# Patient Record
Sex: FEMALE | Race: WHITE | NOT HISPANIC OR LATINO | Employment: OTHER | URBAN - METROPOLITAN AREA
[De-identification: names, ages, dates, MRNs, and addresses within clinical notes are randomized per-mention and may not be internally consistent; named-entity substitution may affect disease eponyms.]

---

## 2017-10-25 ENCOUNTER — HOSPITAL ENCOUNTER (OUTPATIENT)
Dept: LAB | Facility: MEDICAL CENTER | Age: 27
End: 2017-10-25
Attending: NURSE PRACTITIONER
Payer: COMMERCIAL

## 2017-10-25 LAB
ALBUMIN SERPL BCP-MCNC: 4.3 G/DL (ref 3.2–4.9)
ALBUMIN/GLOB SERPL: 1.7 G/DL
ALP SERPL-CCNC: 79 U/L (ref 30–99)
ALT SERPL-CCNC: 11 U/L (ref 2–50)
ANION GAP SERPL CALC-SCNC: 8 MMOL/L (ref 0–11.9)
AST SERPL-CCNC: 15 U/L (ref 12–45)
BILIRUB SERPL-MCNC: 0.5 MG/DL (ref 0.1–1.5)
BUN SERPL-MCNC: 14 MG/DL (ref 8–22)
CALCIUM SERPL-MCNC: 9.2 MG/DL (ref 8.5–10.5)
CHLORIDE SERPL-SCNC: 102 MMOL/L (ref 96–112)
CHOLEST SERPL-MCNC: 163 MG/DL (ref 100–199)
CO2 SERPL-SCNC: 24 MMOL/L (ref 20–33)
CREAT SERPL-MCNC: 0.82 MG/DL (ref 0.5–1.4)
CREAT UR-MCNC: 12.9 MG/DL
GFR SERPL CREATININE-BSD FRML MDRD: >60 ML/MIN/1.73 M 2
GLOBULIN SER CALC-MCNC: 2.6 G/DL (ref 1.9–3.5)
GLUCOSE SERPL-MCNC: 283 MG/DL (ref 65–99)
HDLC SERPL-MCNC: 85 MG/DL
LDLC SERPL CALC-MCNC: 65 MG/DL
MICROALBUMIN UR-MCNC: <0.7 MG/DL
MICROALBUMIN/CREAT UR: NORMAL MG/G (ref 0–30)
POTASSIUM SERPL-SCNC: 4.4 MMOL/L (ref 3.6–5.5)
PROT SERPL-MCNC: 6.9 G/DL (ref 6–8.2)
SODIUM SERPL-SCNC: 134 MMOL/L (ref 135–145)
T4 FREE SERPL-MCNC: 0.84 NG/DL (ref 0.53–1.43)
TRIGL SERPL-MCNC: 67 MG/DL (ref 0–149)
TSH SERPL DL<=0.005 MIU/L-ACNC: 0.65 UIU/ML (ref 0.3–3.7)

## 2017-10-25 PROCEDURE — 82570 ASSAY OF URINE CREATININE: CPT

## 2017-10-25 PROCEDURE — 80061 LIPID PANEL: CPT

## 2017-10-25 PROCEDURE — 82043 UR ALBUMIN QUANTITATIVE: CPT

## 2017-10-25 PROCEDURE — 36415 COLL VENOUS BLD VENIPUNCTURE: CPT

## 2017-10-25 PROCEDURE — 84443 ASSAY THYROID STIM HORMONE: CPT

## 2017-10-25 PROCEDURE — 84681 ASSAY OF C-PEPTIDE: CPT

## 2017-10-25 PROCEDURE — 80053 COMPREHEN METABOLIC PANEL: CPT

## 2017-10-25 PROCEDURE — 84439 ASSAY OF FREE THYROXINE: CPT

## 2017-10-27 LAB — C PEPTIDE SERPL-MCNC: 0.3 NG/ML (ref 0.8–3.5)

## 2018-05-14 ENCOUNTER — OFFICE VISIT (OUTPATIENT)
Dept: URGENT CARE | Facility: CLINIC | Age: 28
End: 2018-05-14
Payer: COMMERCIAL

## 2018-05-14 VITALS
SYSTOLIC BLOOD PRESSURE: 110 MMHG | RESPIRATION RATE: 16 BRPM | HEART RATE: 89 BPM | TEMPERATURE: 98.6 F | HEIGHT: 63 IN | OXYGEN SATURATION: 95 % | DIASTOLIC BLOOD PRESSURE: 70 MMHG | BODY MASS INDEX: 23.39 KG/M2 | WEIGHT: 132 LBS

## 2018-05-14 DIAGNOSIS — L03.314 CELLULITIS OF GROIN: ICD-10-CM

## 2018-05-14 PROCEDURE — 99213 OFFICE O/P EST LOW 20 MIN: CPT | Performed by: NURSE PRACTITIONER

## 2018-05-14 RX ORDER — TRAMADOL HYDROCHLORIDE 50 MG/1
50 TABLET ORAL EVERY 4 HOURS PRN
COMMUNITY
End: 2019-06-18

## 2018-05-14 RX ORDER — IBUPROFEN 800 MG/1
800 TABLET ORAL EVERY 8 HOURS PRN
Qty: 45 TAB | Refills: 0 | Status: SHIPPED | OUTPATIENT
Start: 2018-05-14 | End: 2019-06-18

## 2018-05-14 RX ORDER — SULFAMETHOXAZOLE AND TRIMETHOPRIM 800; 160 MG/1; MG/1
1 TABLET ORAL 2 TIMES DAILY
Qty: 20 TAB | Refills: 0 | Status: SHIPPED | OUTPATIENT
Start: 2018-05-14 | End: 2018-05-24

## 2018-05-14 ASSESSMENT — ENCOUNTER SYMPTOMS
COUGH: 0
DIARRHEA: 0
CHILLS: 0
MYALGIAS: 0
NAUSEA: 0
VOMITING: 0
PALPITATIONS: 0
SORE THROAT: 0
FEVER: 0
SHORTNESS OF BREATH: 0
DIZZINESS: 0
HEADACHES: 0

## 2018-05-14 NOTE — PROGRESS NOTES
"Subjective:      Rachel Esparza is a 28 y.o. female who presents with Cyst (on the tailbone, x 3 weeks)    Chief Complaint   Patient presents with   • Cyst     on the tailbone, x 3 weeks     States X 3 weeks, waxes and wanes  Putting CBD oil on it  Painful at times, worse than others.  Has had one like this before  No fever  No dysuria  No trouble with bowels          HPI    Review of Systems   Constitutional: Negative for chills, fever and malaise/fatigue.   HENT: Negative for congestion and sore throat.    Respiratory: Negative for cough and shortness of breath.    Cardiovascular: Negative for chest pain and palpitations.   Gastrointestinal: Negative for diarrhea, nausea and vomiting.   Genitourinary: Negative for dysuria.   Musculoskeletal: Negative for myalgias.   Skin: Negative for rash.   Neurological: Negative for dizziness and headaches.          Objective:     /70   Pulse 89   Temp 37 °C (98.6 °F)   Resp 16   Ht 1.6 m (5' 3\")   Wt 59.9 kg (132 lb)   SpO2 95%   BMI 23.38 kg/m²      Physical Exam   Constitutional: She is oriented to person, place, and time. She appears well-developed and well-nourished. No distress.   HENT:   Head: Normocephalic and atraumatic.   Cardiovascular: Normal rate.    Pulmonary/Chest: Effort normal.   Abdominal: Soft.   Musculoskeletal: Normal range of motion.   Neurological: She is alert and oriented to person, place, and time.   Skin: Skin is warm and dry.        Psychiatric: She has a normal mood and affect. Her behavior is normal. Judgment and thought content normal.   Nursing note and vitals reviewed.              Assessment/Plan:     1. Cellulitis of groin  Will heat pack and monitor  Return in 3 days for recheck  Discussed draining however no fluctuants appears and no defined center. .    - sulfamethoxazole-trimethoprim (BACTRIM DS) 800-160 MG tablet; Take 1 Tab by mouth 2 times a day for 10 days.  Dispense: 20 Tab; Refill: 0  - ibuprofen (MOTRIN) " 800 MG Tab; Take 1 Tab by mouth every 8 hours as needed.  Dispense: 45 Tab; Refill: 0    Please make an appointment for follow up with your primary care provider or make appointment to establish with a primary care. Return for any perception of change in condition, worsening of symptoms, such as perception of worse pain, worsening fever, not getting better, or any other concerns. Please go to the nearest emergency room for any shortness of breath or chest pain or for any of the emergent conditions we have discussed. Patient and/or family states understanding to plan of care, and discharge instructions. Different diagnosis were discussed and signs/symptoms were discussed to educate on.

## 2019-02-07 ENCOUNTER — HOSPITAL ENCOUNTER (OUTPATIENT)
Dept: LAB | Facility: MEDICAL CENTER | Age: 29
End: 2019-02-07
Attending: NURSE PRACTITIONER
Payer: COMMERCIAL

## 2019-02-07 LAB
ALBUMIN SERPL BCP-MCNC: 4.2 G/DL (ref 3.2–4.9)
ALBUMIN/GLOB SERPL: 1.4 G/DL
ALP SERPL-CCNC: 74 U/L (ref 30–99)
ALT SERPL-CCNC: 20 U/L (ref 2–50)
ANION GAP SERPL CALC-SCNC: 7 MMOL/L (ref 0–11.9)
AST SERPL-CCNC: 26 U/L (ref 12–45)
BILIRUB SERPL-MCNC: 0.5 MG/DL (ref 0.1–1.5)
BUN SERPL-MCNC: 9 MG/DL (ref 8–22)
CALCIUM SERPL-MCNC: 9.2 MG/DL (ref 8.5–10.5)
CHLORIDE SERPL-SCNC: 104 MMOL/L (ref 96–112)
CHOLEST SERPL-MCNC: 147 MG/DL (ref 100–199)
CO2 SERPL-SCNC: 26 MMOL/L (ref 20–33)
CREAT SERPL-MCNC: 0.77 MG/DL (ref 0.5–1.4)
CREAT UR-MCNC: 87.9 MG/DL
FASTING STATUS PATIENT QL REPORTED: NORMAL
GLOBULIN SER CALC-MCNC: 2.9 G/DL (ref 1.9–3.5)
GLUCOSE SERPL-MCNC: 285 MG/DL (ref 65–99)
HDLC SERPL-MCNC: 63 MG/DL
LDLC SERPL CALC-MCNC: 71 MG/DL
MICROALBUMIN UR-MCNC: <0.7 MG/DL
MICROALBUMIN/CREAT UR: NORMAL MG/G (ref 0–30)
POTASSIUM SERPL-SCNC: 4.5 MMOL/L (ref 3.6–5.5)
PROT SERPL-MCNC: 7.1 G/DL (ref 6–8.2)
SODIUM SERPL-SCNC: 137 MMOL/L (ref 135–145)
T4 FREE SERPL-MCNC: 0.84 NG/DL (ref 0.53–1.43)
TRIGL SERPL-MCNC: 66 MG/DL (ref 0–149)
TSH SERPL DL<=0.005 MIU/L-ACNC: 0.54 UIU/ML (ref 0.38–5.33)

## 2019-02-07 PROCEDURE — 84439 ASSAY OF FREE THYROXINE: CPT

## 2019-02-07 PROCEDURE — 36415 COLL VENOUS BLD VENIPUNCTURE: CPT

## 2019-02-07 PROCEDURE — 82043 UR ALBUMIN QUANTITATIVE: CPT

## 2019-02-07 PROCEDURE — 80061 LIPID PANEL: CPT

## 2019-02-07 PROCEDURE — 82570 ASSAY OF URINE CREATININE: CPT

## 2019-02-07 PROCEDURE — 84443 ASSAY THYROID STIM HORMONE: CPT

## 2019-02-07 PROCEDURE — 80053 COMPREHEN METABOLIC PANEL: CPT

## 2019-06-18 ENCOUNTER — HOSPITAL ENCOUNTER (EMERGENCY)
Facility: MEDICAL CENTER | Age: 29
End: 2019-06-18
Attending: EMERGENCY MEDICINE
Payer: COMMERCIAL

## 2019-06-18 VITALS
HEART RATE: 85 BPM | RESPIRATION RATE: 16 BRPM | HEIGHT: 63 IN | SYSTOLIC BLOOD PRESSURE: 132 MMHG | WEIGHT: 149.69 LBS | OXYGEN SATURATION: 98 % | BODY MASS INDEX: 26.52 KG/M2 | TEMPERATURE: 99.4 F | DIASTOLIC BLOOD PRESSURE: 90 MMHG

## 2019-06-18 DIAGNOSIS — E10.9 TYPE 1 DIABETES MELLITUS WITHOUT COMPLICATION (HCC): ICD-10-CM

## 2019-06-18 DIAGNOSIS — Z76.0 MEDICATION REFILL: ICD-10-CM

## 2019-06-18 LAB — GLUCOSE BLD-MCNC: 209 MG/DL (ref 65–99)

## 2019-06-18 PROCEDURE — 99282 EMERGENCY DEPT VISIT SF MDM: CPT

## 2019-06-18 PROCEDURE — 82962 GLUCOSE BLOOD TEST: CPT

## 2019-06-18 NOTE — ED NOTES
Pt discharged home. Explained discharge and medication instructions. Questions and comments addressed. Pt verbalized understanding of instructions. Pt advised to follow-up with [facility] or return to ED for any new or worsening of symptoms. Pt is ambulating well and steady on feet. VS stable.

## 2019-06-18 NOTE — ED TRIAGE NOTES
".Rachel Esparza  .  Chief Complaint   Patient presents with   • Medication Refill     patient reports she is out of her humalog, last dose was sunday night and her lantus, last dose was last night.      Patient to triage with above complaint. Patient denies any pain or n/v. Patient reports her endocrinologist left the office and she is unable to get her insulin refilled. .Blood Pressure: 132/90, Pulse: 85, Respiration: 16, Temperature: 37.4 °C (99.4 °F), Height: 160 cm (5' 3\"), Weight: 67.9 kg (149 lb 11.1 oz), Pulse Oximetry: 98 %    Patient to lobby and instructed to inform staff of any needs.  "

## 2019-06-18 NOTE — ED PROVIDER NOTES
ED Provider Note    CHIEF COMPLAINT  Chief Complaint   Patient presents with   • Medication Refill     patient reports she is out of her humalog, last dose was sunday night and her lantus, last dose was last night.        HPI  Rachel Esparza is a 29 y.o. female who presents for refill of her prescription.  Patient has a history of diabetes mellitus and is currently on insulin.  The patient states that her endocrinologist left the practice office she was going to and that they would no longer feel her prescriptions.  She was referred back to primary care who would not see her because she has not been seen for 2 years.  Patient is having no acute symptoms.    REVIEW OF SYSTEMS  See HPI for further details.  No history of thyroid dysfunction, seizures, cardiopulmonary disorders, gastrointestinal disorders;    PAST MEDICAL HISTORY  Past Medical History:   Diagnosis Date   • Diabetes (HCC)    • Hyperlipidemia        FAMILY HISTORY  Family History   Problem Relation Age of Onset   • Hypertension Father    • Diabetes Maternal Uncle    • Diabetes Paternal Aunt    • Diabetes Paternal Uncle    • Arthritis Maternal Grandmother         Rheumatoid arthritis       SOCIAL HISTORY  Non-smoker; positive alcohol use; positive marijuana use;  SURGICAL HISTORY  History reviewed. No pertinent surgical history.    CURRENT MEDICATIONS  Home Medications     Reviewed by Jenny Dill R.N. (Registered Nurse) on 06/18/19 at 1205  Med List Status: Complete   Medication Last Dose Status   B-D ULTRAFINE III SHORT PEN 31G X 8 MM MISC  Active   insulin aspart (NOVOLOG FLEXPEN) 100 UNIT/ML SOPN injection  Active   insulin lispro (HUMALOG) 100 UNIT/ML Solution 6/16/2019 Active   LANTUS SOLOSTAR 100 UNIT/ML SOPN injection 6/17/2019 Active   ONE TOUCH ULTRA TEST strip  Active   ONE TOUCH ULTRASOFT LANCETS MISC  Active                ALLERGIES  No Known Allergies    PHYSICAL EXAM  VITAL SIGNS: /90   Pulse 85   Temp 37.4 °C  "(99.4 °F) (Temporal)   Resp 16   Ht 1.6 m (5' 3\")   Wt 67.9 kg (149 lb 11.1 oz)   SpO2 98%   BMI 26.52 kg/m²    Constitutional: 29-year-old female, anxious, awake, oriented x3  HENT: Normocephalic, Atraumatic, Nares:Clear, Oropharynx: moist, well hydrated, posterior pharynx:clear   Eyes: PERRL, EOMI, Conjunctiva normal, No discharge.   Neck: Normal range of motion, No tenderness, Supple, No stridor.   Lymphatic: No lymphadenopathy noted.   Cardiovascular: Regular rate and rhythm without mumurs, gallups, rubs   Thorax & Lungs: Normal Equal breath sounds, No respiratory distress, No wheezing, no stridor, no rales. No chest tenderness.   Skin: Good skin turgor, pink, warm, dry. No rashes, petechiae, purpura. Normal capillary refill.   Neurologic: Alert & oriented x 3,  No gross focal deficits noted.   Psychiatric: Affect normal, Judgment normal, Mood normal.     COURSE & MEDICAL DECISION MAKING  Pertinent Labs & Imaging studies reviewed. (See chart for details)  Discussion: At this time, the patient presents for prescription refill.  I see no acute conditions that warrant work-up or intervention in the ED.  I have written a prescription for her Humalog.  She is to follow-up and reestablish primary care and endocrinology care.    FINAL IMPRESSION  1. Medication refill    2. Type 1 diabetes mellitus without complication (HCC)           PLAN  1.  Appropriate discharge instructions given  2.  Humalog prescription written  3.  Follow-up with primary care    Electronically signed by: Guy G Gansert, 6/18/2019 12:35 PM      "

## 2019-07-15 ENCOUNTER — TELEPHONE (OUTPATIENT)
Dept: SCHEDULING | Facility: IMAGING CENTER | Age: 29
End: 2019-07-15

## 2019-08-23 ENCOUNTER — OFFICE VISIT (OUTPATIENT)
Dept: MEDICAL GROUP | Facility: LAB | Age: 29
End: 2019-08-23
Payer: COMMERCIAL

## 2019-08-23 VITALS
BODY MASS INDEX: 25.98 KG/M2 | OXYGEN SATURATION: 96 % | RESPIRATION RATE: 16 BRPM | TEMPERATURE: 97.4 F | HEART RATE: 70 BPM | DIASTOLIC BLOOD PRESSURE: 64 MMHG | SYSTOLIC BLOOD PRESSURE: 106 MMHG | WEIGHT: 146.6 LBS | HEIGHT: 63 IN

## 2019-08-23 DIAGNOSIS — E10.9 TYPE 1 DIABETES MELLITUS WITHOUT COMPLICATION (HCC): ICD-10-CM

## 2019-08-23 DIAGNOSIS — R21 RASH: ICD-10-CM

## 2019-08-23 DIAGNOSIS — E83.119 HEMOCHROMATOSIS, UNSPECIFIED HEMOCHROMATOSIS TYPE: ICD-10-CM

## 2019-08-23 LAB
HBA1C MFR BLD: 7.9 % (ref 0–5.6)
INT CON NEG: NEGATIVE
INT CON POS: POSITIVE

## 2019-08-23 PROCEDURE — 99215 OFFICE O/P EST HI 40 MIN: CPT | Performed by: NURSE PRACTITIONER

## 2019-08-23 PROCEDURE — 83036 HEMOGLOBIN GLYCOSYLATED A1C: CPT | Performed by: NURSE PRACTITIONER

## 2019-08-23 RX ORDER — CLOTRIMAZOLE AND BETAMETHASONE DIPROPIONATE 10; .64 MG/G; MG/G
1 CREAM TOPICAL 2 TIMES DAILY
Qty: 1 TUBE | Refills: 0 | Status: ON HOLD | OUTPATIENT
Start: 2019-08-23 | End: 2021-08-06

## 2019-08-23 ASSESSMENT — PATIENT HEALTH QUESTIONNAIRE - PHQ9: CLINICAL INTERPRETATION OF PHQ2 SCORE: 0

## 2019-08-23 NOTE — ASSESSMENT & PLAN NOTE
New to me, chronic.  Diagnosed at 25[facility] by hematology.   She has had 3 phlebotomies.  Currently stable.  Requests referral back to a new hematologist.

## 2019-08-23 NOTE — ASSESSMENT & PLAN NOTE
New to me, chronic.  Diagnosed 4 years ago.  She was following with endocrinology at [facility] however her endocrinologist left and they never established her with a new one.  Her Humalog and was sent to the ER to refill hence she needed to establish with primary care and get referral back to an endocrinologist.  A1c is improved from 9.1 to 7.9.    Lantus 18 units at bedtime and  Humalog : >150 1:50 correction  1:10 CHO ratio     This is chronic. Stable. Currently taking  as directed. She is not taking a daily aspirin, statin, and ACE-I/ARB on recommendation from her prior endocrinologist.   Denies side effects or hypoglycemic events from medications.  Last HbA1c is 7.9  She is monitoring blood sugar regularly at home.   Reports diet has been very strict lately.  Carb restricting. She is not getting any low blood sugars.   She is exercising regularly. Weights and cardio  Last eye exam: 2 months ago. [facility]  Doing regular foot exams and care.  Denies polyuria, polydipsia, blurred vision, early satiety, or neuropathies.

## 2019-08-23 NOTE — ASSESSMENT & PLAN NOTE
New to me, chronic.  Patient has rash on bilateral lower extremities.  Itchy circular red rash which is sometimes better without any intervention.  No blisters or pustules. Rash is not painful. Not precipitated by sun exposure.   No topical or other home treatment tried.   No new lotion, soap, detergent, sheets, towels, topical or oral medicine.   No similarly affected contacts. No pets in household. No known insect infestations in household.    ROS:  No mouth or lip redness, itching, blisters, or swelling  No difficulty swallowing or breathing.  No cough or wheeze.

## 2019-08-23 NOTE — PROGRESS NOTES
CC:Diagnoses of Hemochromatosis, unspecified hemochromatosis type, Type 1 diabetes mellitus without complication (HCC), and Rash were pertinent to this visit.    HISTORY OF PRESENT ILLNESS: Rachel Esparza is a 29 y.o. female established patient who presents today to discuss the following problems:    Type 1 diabetes mellitus without complication  New to me, chronic.  Diagnosed 4 years ago.  She was following with endocrinology at [facility] however her endocrinologist left and they never established her with a new one.  Her Humalog and was sent to the ER to refill hence she needed to establish with primary care and get referral back to an endocrinologist.  A1c is improved from 9.1 to 7.9.    Lantus 18 units at bedtime and  Humalog : >150 1:50 correction  1:10 CHO ratio     This is chronic. Stable. Currently taking  as directed. She is not taking a daily aspirin, statin, and ACE-I/ARB on recommendation from her prior endocrinologist.   Denies side effects or hypoglycemic events from medications.  Last HbA1c is 7.9  She is monitoring blood sugar regularly at home.   Reports diet has been very strict lately.  Carb restricting. She is not getting any low blood sugars.   She is exercising regularly. Weights and cardio  Last eye exam: 2 months ago. [facility].  Doing regular foot exams and care.  Denies polyuria, polydipsia, blurred vision, early satiety, or neuropathies.          Hemochromatosis  New to me, chronic.  Diagnosed at 25[facility] by hematology.   She has had 3 phlebotomies.  Currently stable.  Requests referral back to a new hematologist.    Rash  New to me, chronic.  Patient has rash on bilateral lower extremities.  Itchy circular red rash which is sometimes better without any intervention.  No blisters or pustules. Rash is not painful. Not precipitated by sun exposure.   No topical or other home treatment tried.   No new lotion, soap, detergent, sheets, towels, topical or oral medicine.   No similarly  affected contacts. No pets in household. No known insect infestations in household.    ROS:  No mouth or lip redness, itching, blisters, or swelling  No difficulty swallowing or breathing.  No cough or wheeze.         Health Maintenance: Completed    Patient Active Problem List    Diagnosis Date Noted   • Rash 08/23/2019   • Hemochromatosis 10/04/2018   • Type 1 diabetes mellitus without complication (HCC) 05/22/2015        Allergies:Patient has no known allergies.    Current Outpatient Medications   Medication Sig Dispense Refill   • clotrimazole-betamethasone (LOTRISONE) 1-0.05 % Cream Apply 1 Application to affected area(s) 2 times a day. 1 Tube 0   • insulin lispro (HUMALOG) 100 UNIT/ML Solution Use as directed 5 Vial 1   • LANTUS SOLOSTAR 100 UNIT/ML SOPN injection Inject 14 Units as instructed every bedtime.     • B-D ULTRAFINE III SHORT PEN 31G X 8 MM MISC 1 Each every bedtime.     • insulin regular (HUMULIN R) 100 Unit/mL Solution Use as directed (Patient not taking: Reported on 8/23/2019) 5 Vial 3   • insulin aspart (NOVOLOG FLEXPEN) 100 UNIT/ML SOPN injection Inject 3 Units as instructed 3 times a day before meals. (Patient not taking: Reported on 8/23/2019) 1 PEN 0   • ONE TOUCH ULTRA TEST strip 1 Each every day.     • ONE TOUCH ULTRASOFT LANCETS MISC 1 Each every day.       No current facility-administered medications for this visit.        Social History     Tobacco Use   • Smoking status: Never Smoker   • Smokeless tobacco: Never Used   Substance Use Topics   • Alcohol use: Yes     Alcohol/week: 3.0 oz     Types: 5 Shots of liquor per week     Comment: 1-2 drink daily   • Drug use: Yes     Types: Marijuana, Inhaled     Comment: marijuana     Social History     Social History Narrative   • Not on file       Family History   Problem Relation Age of Onset   • Hypertension Father    • Diabetes Maternal Uncle    • Diabetes Paternal Aunt    • Diabetes Paternal Uncle    • Arthritis Maternal Grandmother         " Rheumatoid arthritis       ROS:     Constitutional:  Negative for fever, chills, unexpected weight change, and fatigue/generalized weakness.  HEENT:  Negative for headaches, vision changes, hearing changes, ear pain, ear discharge, rhinorrhea, sinus congestion, sore throat, and neck pain.    Respiratory: Negative for cough, sputum production, chest congestion, dyspnea, wheezing, and crackles.    Cardiovascular:Negative for chest pain, palpitations, orthopnea, and bilateral lower extremity edema.   Gastrointestinal:Negative for heartburn, nausea, vomiting, abdominal pain, hematochezia, melena, diarrhea, constipation, and greasy/foul-smelling stools.   Genitourinary: Negative for dysuria, polyuria, hematuria, pyuria, urinary urgency, and urinary incontinence.   Musculoskeletal:Negative for myalgias, back pain, and joint pain.   Skin: Negative for rash, itching, cyanotic skin color change.   Neurological: Negative for dizziness, tingling, tremors, focal sensory deficit, focal weakness and headaches.   Endo/Heme/Allergies: Does not bruise/bleed easily.   Psychiatric/Behavioral: Negative for depression, suicidal/homicidal ideation and memory loss.        EXAM:   /64 (BP Location: Right arm, Patient Position: Sitting, BP Cuff Size: Adult)   Pulse 70   Temp 36.3 °C (97.4 °F) (Temporal)   Resp 16   Ht 1.6 m (5' 3\")   Wt 66.5 kg (146 lb 9.6 oz)   SpO2 96%  Body mass index is 25.97 kg/m².    Constitutional: Well-developed and well-nourished. Not diaphoretic. No distress.   Skin: Skin is warm and dry. Multiple small circular patches of erythema bilateral lower extremities. No open lesions or discharge.  Head: Atraumatic without lesions.  Eyes: Conjunctivae and extraocular motions are normal. Pupils are equal, round, and reactive to light. No scleral icterus.   Ears:  External ears unremarkable. Tympanic membranes clear and intact.  Nose: Nares patent. Mucosa without edema or erythema. No discharge. No facial " tenderness.  Mouth/Throat: Tongue normal. Oropharynx is clear and moist. Posterior pharynx without erythema or exudates.  Neck: Supple, trachea midline. No thyromegaly present. No cervical or supraclavicular lymphadenopathy.  Cardiovascular: Regular rate and rhythm.   Chest: Effort normal. Clear to auscultation throughout. No adventitious sounds.   Abdomen: Soft, non tender, and without distention. Active bowel sounds in all four quadrants. No rebound, guarding, masses or hepatosplenomegaly.  Extremities: No cyanosis, clubbing, erythema, nor edema.   Neurological: Alert and oriented x 3. Sensation intact.   Psychiatric:  Behavior, mood, and affect are appropriate.       ASSESSMENT/PLAN:    1. Hemochromatosis, unspecified hemochromatosis type  Chronic stable. Needs new hematologist.  - REFERRAL TO HEMATOLOGY ONCOLOGY Referral to [facility]    2. Type 1 diabetes mellitus without complication (HCC)  Chronic stable. Negative monofilament.   Eye exam records requested  - REFERRAL TO ENDOCRINOLOGY  - POCT Hemoglobin A1C  - Diabetic Monofilament Lower Extremity Exam    3. Rash  New problem uncontrolled.    - clotrimazole-betamethasone (LOTRISONE) 1-0.05 % Cream; Apply 1 Application to affected area(s) 2 times a day.  Dispense: 1 Tube; Refill: 0    4.  Lipomas vs ganglion cysts.  Bilateral knees, right ankle    Total 40 minutes face-to-face time spent with patient, with greater than 50% of the total time discussing patient's issues and symptoms as listed above in assessment and plan, as well as managing coordination of care for future evaluation and treatment.    Return in about 3 months (around 11/23/2019) for Routine Follow up.       Please note that this dictation was created using voice recognition software. I have made every reasonable attempt to correct obvious errors, but I expect that there are errors of grammar and possibly content that I did not discover before finalizing the note.

## 2019-10-24 NOTE — TELEPHONE ENCOUNTER
Was the patient seen in the last year in this department? Yes  8/23/19  Does patient have an active prescription for medications requested? No     Received Request Via: Pharmacy

## 2019-10-25 ENCOUNTER — TELEPHONE (OUTPATIENT)
Dept: MEDICAL GROUP | Facility: PHYSICIAN GROUP | Age: 29
End: 2019-10-25

## 2019-10-25 DIAGNOSIS — E10.9 TYPE 1 DIABETES MELLITUS WITHOUT COMPLICATION (HCC): ICD-10-CM

## 2019-10-25 NOTE — TELEPHONE ENCOUNTER
Patient called,stating she had run out of her Humalog Pens--I sent a Rx for 5 pens with 1 refill to [facility] on [address].   Darci Underwood M.D.

## 2019-11-18 ENCOUNTER — OFFICE VISIT (OUTPATIENT)
Dept: ENDOCRINOLOGY | Facility: MEDICAL CENTER | Age: 29
End: 2019-11-18
Payer: COMMERCIAL

## 2019-11-18 VITALS
HEIGHT: 63 IN | BODY MASS INDEX: 26.75 KG/M2 | DIASTOLIC BLOOD PRESSURE: 72 MMHG | SYSTOLIC BLOOD PRESSURE: 116 MMHG | WEIGHT: 151 LBS | OXYGEN SATURATION: 92 % | HEART RATE: 90 BPM

## 2019-11-18 DIAGNOSIS — E10.9 TYPE 1 DIABETES MELLITUS WITHOUT COMPLICATION (HCC): ICD-10-CM

## 2019-11-18 DIAGNOSIS — Z79.4 ENCOUNTER FOR LONG-TERM (CURRENT) USE OF INSULIN (HCC): ICD-10-CM

## 2019-11-18 LAB
HBA1C MFR BLD: 8.3 % (ref 0–5.6)
INT CON NEG: NEGATIVE
INT CON POS: POSITIVE

## 2019-11-18 PROCEDURE — 83036 HEMOGLOBIN GLYCOSYLATED A1C: CPT | Performed by: PHYSICIAN ASSISTANT

## 2019-11-18 PROCEDURE — 99204 OFFICE O/P NEW MOD 45 MIN: CPT | Performed by: PHYSICIAN ASSISTANT

## 2019-11-18 NOTE — PATIENT INSTRUCTIONS
They are on:  1.  Lantus   19 before bed (tresiba 18 at night before bed)  2.  Humalog  Carb ratio 1:10 carbs  Correction factor 1:50 above 100

## 2019-11-18 NOTE — PROGRESS NOTES
New Patient Consult Note  Referred by: OPAL Grissom    Reason for consult: Diabetes Management Type 1    HPI:  Rachel Esparza is a 29 y.o. old patient who is seeing us today for diabetes care.  This is a pleasant patient with diabetes and I appreciate the opportunity to participate in the care of this patient.  This is a new patient with me today.    Labs of 11/18/2019 HbA1c is 8.3  Labs of 2/7/19 microalbumin negative, GFR >60, HDL 63, LDL 71, TSH 0.540,    Labs of 10/25/17 c-peptide 0.2, SHEA-65 negative, isletcell negative, c-peptide 1.1    BG Diary:11/18/2019  In the AM:  No log    Has been Diabetic since  T1 4 years  Has a Glucagon pen at home: no    They get there labs done at: [facility]  They see an eye provider at: seeing [facility]  Hyperlipidemia: labs ordered    1. Type 1 diabetes mellitus without complication (HCC)  This is a new patient with me on 11/18/2019  They are on:  1.  Lantus   19 before bed  2.  Humalog  Carb ratio 1:10 carbs  Correction factor 1:50 above 150    2. Encounter for long-term (current) use of insulin (HCC)  Is on a high risk medication Insulin and we will continue to follow       ROS:   Constitutional: No change in weight , No fatigue, No night sweats.  HEENT: No Headache.  Eyes:  No blurred vision, No visual changes.  Cardiac: No chest pain, No palpitations.  Resp: No shortness of breath, No cough,   Gastro: No nausea or vomiting, No diarrhea.  Neuro: Denies numbness or tinging in bilateral feet or hands, and no loss of sensation.  Endo: No heat or cold intolerance.  : No polyuria, No polydipsia, No chronic UTI's.  Lower extremities: No lower leg edema bilateral.  All other systems were reviewed and were negative.    Past Medical History:  Patient Active Problem List    Diagnosis Date Noted   • Encounter for long-term (current) use of insulin (HCC) 11/18/2019   • Rash 08/23/2019   • Hemochromatosis 10/04/2018   • Type 1 diabetes mellitus without  complication (HCC) 05/22/2015       Past Surgical History:  History reviewed. No pertinent surgical history.    Allergies:  Patient has no known allergies.    Social History:  Social History     Socioeconomic History   • Marital status: Single     Spouse name: Not on file   • Number of children: Not on file   • Years of education: Not on file   • Highest education level: Not on file   Occupational History   • Not on file   Social Needs   • Financial resource strain: Not on file   • Food insecurity:     Worry: Not on file     Inability: Not on file   • Transportation needs:     Medical: Not on file     Non-medical: Not on file   Tobacco Use   • Smoking status: Never Smoker   • Smokeless tobacco: Never Used   Substance and Sexual Activity   • Alcohol use: Yes     Alcohol/week: 3.0 oz     Types: 5 Shots of liquor per week     Comment: 1-2 drink daily   • Drug use: Yes     Types: Marijuana, Inhaled     Comment: marijuana   • Sexual activity: Yes     Partners: Male     Birth control/protection: Condom   Lifestyle   • Physical activity:     Days per week: Not on file     Minutes per session: Not on file   • Stress: Not on file   Relationships   • Social connections:     Talks on phone: Not on file     Gets together: Not on file     Attends Rastafari service: Not on file     Active member of club or organization: Not on file     Attends meetings of clubs or organizations: Not on file     Relationship status: Not on file   • Intimate partner violence:     Fear of current or ex partner: Not on file     Emotionally abused: Not on file     Physically abused: Not on file     Forced sexual activity: Not on file   Other Topics Concern   • Not on file   Social History Narrative   • Not on file       Family History:  Family History   Problem Relation Age of Onset   • Hypertension Father    • Diabetes Maternal Uncle    • Diabetes Paternal Aunt    • Diabetes Paternal Uncle    • Arthritis Maternal Grandmother         Rheumatoid  "arthritis       Medications:    Current Outpatient Medications:   •  insulin lispro, Human, (HUMALOG) 100 UNIT/ML injection PEN, Inject 1 Unit per 10 gm of carbohydrates before meals, Disp: 5 PEN, Rfl: 1  •  LANTUS SOLOSTAR 100 UNIT/ML SOPN injection, Inject 14 Units as instructed every bedtime., Disp: , Rfl:   •  B-D ULTRAFINE III SHORT PEN 31G X 8 MM MISC, 1 Each every bedtime., Disp: , Rfl:   •  clotrimazole-betamethasone (LOTRISONE) 1-0.05 % Cream, Apply 1 Application to affected area(s) 2 times a day., Disp: 1 Tube, Rfl: 0      Physical Examination:   Vital signs: /72 (BP Location: Left arm, Patient Position: Sitting)   Pulse 90   Ht 1.6 m (5' 3\")   Wt 68.5 kg (151 lb)   SpO2 92%   BMI 26.75 kg/m²   General: No distress, cooperative, well dressed and well nourished.   Eyes: No scleral icterus or discharge, No hyposphagma  ENMT: Normal on external inspection of nose, lips, No nasal drainage   Neck: No abnormal masses on inspection  Resp: Normal effort, Bilateral clear to auscultation, No wheezing, No rales  CVS: Regular rate and rhythm, S1 S2 normal, No murmur. No gallop  Extremities: No edema bilateral extremities  Neuro: Alert and oriented  Skin: No rash, No Ulcers  Psych: Normal mood and affect    Assessment and Plan:    1. Type 1 diabetes mellitus without complication (HCC)    They are on:  1.  Lantus   19 before bed (tresiba 18 at night before bed)  2.  Humalog  Carb ratio 1:10 carbs  Correction factor 1:50 above 100  3.  I gave her pamphlets of dexcom, eversense and medtronic and asked her to look them up and decided which she wants. She is in need of a CGM.  4.  Labs of c-peptide and ALL Antibodies ordered    2. Encounter for long-term (current) use of insulin (HCC)  Is on a high risk medication Insulin and we will continue to follow     Return in about 1 month (around 12/18/2019).    This patient during there office visit was started on new medication Tresiba.  Side effects of new medications " were discussed with the patient today in the office. The patient was supplied paperwork on this new medication.    Thank you kindly for allowing me to participate in the diabetes care plan for this patient.    Luc Wilks PA-C, [facility]  Board Certified - Advanced Diabetes Management  11/18/19    CC:   OPAL Grissom

## 2019-12-23 ENCOUNTER — APPOINTMENT (OUTPATIENT)
Dept: ENDOCRINOLOGY | Facility: MEDICAL CENTER | Age: 29
End: 2019-12-23
Payer: COMMERCIAL

## 2020-01-04 ENCOUNTER — HOSPITAL ENCOUNTER (OUTPATIENT)
Dept: LAB | Facility: MEDICAL CENTER | Age: 30
End: 2020-01-04
Attending: INTERNAL MEDICINE
Payer: COMMERCIAL

## 2020-01-04 LAB
ALBUMIN SERPL BCP-MCNC: 4.2 G/DL (ref 3.2–4.9)
ALBUMIN/GLOB SERPL: 1.4 G/DL
ALP SERPL-CCNC: 74 U/L (ref 30–99)
ALT SERPL-CCNC: 12 U/L (ref 2–50)
ANION GAP SERPL CALC-SCNC: 7 MMOL/L (ref 0–11.9)
AST SERPL-CCNC: 13 U/L (ref 12–45)
BASOPHILS # BLD AUTO: 0.6 % (ref 0–1.8)
BASOPHILS # BLD: 0.03 K/UL (ref 0–0.12)
BILIRUB SERPL-MCNC: 0.8 MG/DL (ref 0.1–1.5)
BUN SERPL-MCNC: 11 MG/DL (ref 8–22)
CALCIUM SERPL-MCNC: 9 MG/DL (ref 8.5–10.5)
CHLORIDE SERPL-SCNC: 100 MMOL/L (ref 96–112)
CO2 SERPL-SCNC: 28 MMOL/L (ref 20–33)
CREAT SERPL-MCNC: 0.84 MG/DL (ref 0.5–1.4)
EOSINOPHIL # BLD AUTO: 0.13 K/UL (ref 0–0.51)
EOSINOPHIL NFR BLD: 2.4 % (ref 0–6.9)
ERYTHROCYTE [DISTWIDTH] IN BLOOD BY AUTOMATED COUNT: 42.8 FL (ref 35.9–50)
FERRITIN SERPL-MCNC: 52.1 NG/ML (ref 10–291)
GLOBULIN SER CALC-MCNC: 2.9 G/DL (ref 1.9–3.5)
GLUCOSE SERPL-MCNC: 268 MG/DL (ref 65–99)
HCT VFR BLD AUTO: 44.1 % (ref 37–47)
HGB BLD-MCNC: 14.8 G/DL (ref 12–16)
IMM GRANULOCYTES # BLD AUTO: 0.01 K/UL (ref 0–0.11)
IMM GRANULOCYTES NFR BLD AUTO: 0.2 % (ref 0–0.9)
LYMPHOCYTES # BLD AUTO: 1.46 K/UL (ref 1–4.8)
LYMPHOCYTES NFR BLD: 27.1 % (ref 22–41)
MCH RBC QN AUTO: 32.2 PG (ref 27–33)
MCHC RBC AUTO-ENTMCNC: 33.6 G/DL (ref 33.6–35)
MCV RBC AUTO: 95.9 FL (ref 81.4–97.8)
MONOCYTES # BLD AUTO: 0.38 K/UL (ref 0–0.85)
MONOCYTES NFR BLD AUTO: 7.1 % (ref 0–13.4)
NEUTROPHILS # BLD AUTO: 3.38 K/UL (ref 2–7.15)
NEUTROPHILS NFR BLD: 62.6 % (ref 44–72)
NRBC # BLD AUTO: 0 K/UL
NRBC BLD-RTO: 0 /100 WBC
PLATELET # BLD AUTO: 304 K/UL (ref 164–446)
PMV BLD AUTO: 9.6 FL (ref 9–12.9)
POTASSIUM SERPL-SCNC: 3.9 MMOL/L (ref 3.6–5.5)
PROT SERPL-MCNC: 7.1 G/DL (ref 6–8.2)
RBC # BLD AUTO: 4.6 M/UL (ref 4.2–5.4)
SODIUM SERPL-SCNC: 135 MMOL/L (ref 135–145)
WBC # BLD AUTO: 5.4 K/UL (ref 4.8–10.8)

## 2020-01-04 PROCEDURE — 80053 COMPREHEN METABOLIC PANEL: CPT

## 2020-01-04 PROCEDURE — 82785 ASSAY OF IGE: CPT

## 2020-01-04 PROCEDURE — 85025 COMPLETE CBC W/AUTO DIFF WBC: CPT

## 2020-01-04 PROCEDURE — 82784 ASSAY IGA/IGD/IGG/IGM EACH: CPT

## 2020-01-04 PROCEDURE — 84155 ASSAY OF PROTEIN SERUM: CPT

## 2020-01-04 PROCEDURE — 84165 PROTEIN E-PHORESIS SERUM: CPT

## 2020-01-04 PROCEDURE — 82728 ASSAY OF FERRITIN: CPT

## 2020-01-04 PROCEDURE — 36415 COLL VENOUS BLD VENIPUNCTURE: CPT

## 2020-01-06 ENCOUNTER — HOSPITAL ENCOUNTER (OUTPATIENT)
Facility: MEDICAL CENTER | Age: 30
End: 2020-01-06
Attending: INTERNAL MEDICINE
Payer: COMMERCIAL

## 2020-01-06 LAB
IGA SERPL-MCNC: 338 MG/DL (ref 68–408)
IGE SERPL-ACNC: 97 KU/L
IGM SERPL-MCNC: 87 MG/DL (ref 35–263)

## 2020-01-06 PROCEDURE — 84156 ASSAY OF PROTEIN URINE: CPT

## 2020-01-06 PROCEDURE — 83883 ASSAY NEPHELOMETRY NOT SPEC: CPT

## 2020-01-07 ENCOUNTER — OFFICE VISIT (OUTPATIENT)
Dept: MEDICAL GROUP | Facility: LAB | Age: 30
End: 2020-01-07
Payer: COMMERCIAL

## 2020-01-07 VITALS
HEART RATE: 76 BPM | SYSTOLIC BLOOD PRESSURE: 108 MMHG | HEIGHT: 63 IN | WEIGHT: 152 LBS | TEMPERATURE: 99.3 F | OXYGEN SATURATION: 96 % | BODY MASS INDEX: 26.93 KG/M2 | DIASTOLIC BLOOD PRESSURE: 68 MMHG

## 2020-01-07 DIAGNOSIS — M25.561 CHRONIC PAIN OF BOTH KNEES: ICD-10-CM

## 2020-01-07 DIAGNOSIS — E10.9 TYPE 1 DIABETES MELLITUS WITHOUT COMPLICATION (HCC): ICD-10-CM

## 2020-01-07 DIAGNOSIS — G89.29 CHRONIC PAIN OF BOTH KNEES: ICD-10-CM

## 2020-01-07 DIAGNOSIS — R21 RASH: ICD-10-CM

## 2020-01-07 DIAGNOSIS — M25.562 CHRONIC PAIN OF BOTH KNEES: ICD-10-CM

## 2020-01-07 DIAGNOSIS — E83.119 HEMOCHROMATOSIS, UNSPECIFIED HEMOCHROMATOSIS TYPE: ICD-10-CM

## 2020-01-07 LAB
ALBUMIN SERPL ELPH-MCNC: 4.27 G/DL (ref 3.75–5.01)
ALPHA1 GLOB SERPL ELPH-MCNC: 0.25 G/DL (ref 0.19–0.46)
ALPHA2 GLOB SERPL ELPH-MCNC: 0.67 G/DL (ref 0.48–1.05)
B-GLOBULIN SERPL ELPH-MCNC: 0.82 G/DL (ref 0.48–1.1)
EER PROT ELECT SER Q1092: NORMAL
GAMMA GLOB SERPL ELPH-MCNC: 0.79 G/DL (ref 0.62–1.51)
INTERPRETATION SERPL IFE-IMP: NORMAL
PROT SERPL-MCNC: 6.8 G/DL (ref 6.3–8.2)

## 2020-01-07 PROCEDURE — 99214 OFFICE O/P EST MOD 30 MIN: CPT | Performed by: NURSE PRACTITIONER

## 2020-01-07 RX ORDER — TRIAMCINOLONE ACETONIDE 1 MG/G
1 CREAM TOPICAL 2 TIMES DAILY
Qty: 1 TUBE | Refills: 3 | Status: ON HOLD | OUTPATIENT
Start: 2020-01-07 | End: 2021-08-06

## 2020-01-07 ASSESSMENT — PATIENT HEALTH QUESTIONNAIRE - PHQ9: CLINICAL INTERPRETATION OF PHQ2 SCORE: 0

## 2020-01-07 NOTE — PROGRESS NOTES
CC:Diagnoses of Type 1 diabetes mellitus without complication (HCC), Hemochromatosis, unspecified hemochromatosis type, Rash, and Chronic pain of both knees were pertinent to this visit.    HISTORY OF PRESENT ILLNESS: Rachel Esparza is a 29 y.o. female established patient who presents today to discuss the following problems:    Type 1 diabetes mellitus without complication  Following with Luc Wilks. Doing well on Treciba.    Hemochromatosis  Has follow up with Dr. Daugherty today.    Rash  Patient continues to have rash on her left lower leg that has been present and resistant to steroid treatment.  She does request referral to dermatology today.  It does not light up under [place] lamp.    Knee pain  This is a new to me, chronic problem for which patient has had problems with both knees for several years.  She has seen a orthopedist in [place] previously who told her that she may require surgery at some point.  She has not had any recent imaging but would like to go directly to Ortho for evaluation.  Declines further work-up or PT today.    Health Maintenance: Completed    Patient Active Problem List    Diagnosis Date Noted   • Encounter for long-term (current) use of insulin (HCC) 11/18/2019   • Rash 08/23/2019   • Hemochromatosis 10/04/2018   • Type 1 diabetes mellitus without complication (HCC) 05/22/2015        Allergies:Patient has no known allergies.    Current Outpatient Medications   Medication Sig Dispense Refill   • triamcinolone acetonide (KENALOG) 0.1 % Cream Apply 1 Application to affected area(s) 2 times a day. 1 Tube 3   • insulin lispro, Human, (HUMALOG) 100 UNIT/ML injection PEN Inject 1 Unit per 10 gm of carbohydrates before meals 5 PEN 1   • Insulin Pen Needle 32 G x 4 mm (NOVOFINE PLUS) 1 Applicator by Does not apply route 6 Times a Day. Using one needle tip with tresiba novolog per day 150 Each 11   • clotrimazole-betamethasone (LOTRISONE) 1-0.05 % Cream Apply 1 Application to  "affected area(s) 2 times a day. 1 Tube 0   • LANTUS SOLOSTAR 100 UNIT/ML SOPN injection Inject 14 Units as instructed every bedtime.     • B-D ULTRAFINE III SHORT PEN 31G X 8 MM MISC 1 Each every bedtime.       No current facility-administered medications for this visit.        Social History     Tobacco Use   • Smoking status: Never Smoker   • Smokeless tobacco: Never Used   Substance Use Topics   • Alcohol use: Yes     Alcohol/week: 3.0 oz     Types: 5 Shots of liquor per week     Comment: 1-2 drink daily   • Drug use: Yes     Types: Marijuana, Inhaled     Comment: marijuana     Social History     Patient does not qualify to have social determinant information on file (likely too young).   Social History Narrative   • Not on file       Family History   Problem Relation Age of Onset   • Hypertension Father    • Diabetes Maternal Uncle    • Diabetes Paternal Aunt    • Diabetes Paternal Uncle    • Arthritis Maternal Grandmother         Rheumatoid arthritis       ROS:     No fevers or weight loss  No headaches or sore throat  No chest pain or shortness of breath  No bowel changes  No lower extremity edema  No suicidal ideation or panic attack          EXAM:   /68 (BP Location: Left arm, Patient Position: Sitting, BP Cuff Size: Adult)   Pulse 76   Temp 37.4 °C (99.3 °F) (Temporal)   Ht 1.6 m (5' 3\")   Wt 68.9 kg (152 lb)   SpO2 96%  Body mass index is 26.93 kg/m².      Constitutional: Well-developed and well-nourished. Not diaphoretic. No distress.   Skin: Skin is warm and dry. Multiple small circular patches of erythema bilateral lower extremities. No open lesions or discharge.  Head: Atraumatic without lesions.  Neck: Supple, trachea midline. No thyromegaly present. No cervical or supraclavicular lymphadenopathy.  Cardiovascular: Regular rate and rhythm.   Chest: Effort normal. Clear to auscultation throughout. No adventitious sounds.   Abdomen: Soft, non tender, and without distention. Active bowel sounds " in all four quadrants. No rebound, guarding, masses or hepatosplenomegaly.  Extremities: No cyanosis, clubbing, erythema, nor edema.   Neurological: Alert and oriented x 3. Sensation intact.   Psychiatric:  Behavior, mood, and affect are appropriate.  ASSESSMENT/PLAN:    1. Type 1 diabetes mellitus without complication (HCC)  Chronic stable.  Patient following with endocrinology.  Next appointment on the 16th.  Doing well on Tresiba.  Follow for recommendations.    2. Hemochromatosis, unspecified hemochromatosis type  Chronic stable.  Appointment with Dr. Daugherty this afternoon.  Follow recommendations    3. Rash  Chronic uncontrolled.  Patient requests referral to dermatology  - REFERRAL TO DERMATOLOGY  - triamcinolone acetonide (KENALOG) 0.1 % Cream; Apply 1 Application to affected area(s) 2 times a day.  Dispense: 1 Tube; Refill: 3    4. Chronic pain of both knees  Patient requests referral to Ortho due to chronic pain and prior Ortho recommendations for surgical procedure.  Declines PT or imaging today.  - REFERRAL TO ORTHOPEDICS      Return if symptoms worsen or fail to improve, for Routine Follow up.       Please note that this dictation was created using voice recognition software. I have made every reasonable attempt to correct obvious errors, but I expect that there are errors of grammar and possibly content that I did not discover before finalizing the note.

## 2020-01-09 LAB
ALBUMIN 24H UR QL ELPH: DETECTED
ALPHA1 GLOB 24H UR QL ELPH: DETECTED
ALPHA2 GLOB 24H UR QL ELPH: DETECTED
B-GLOBULIN UR QL ELPH: DETECTED
COLLECT DURATION TIME SPEC: 24 HRS
GAMMA GLOB UR QL ELPH: DETECTED
INTERPRETATION UR IFE-IMP: ABNORMAL
KAPPA LC FREE UR-MCNC: 0.4 MG/DL (ref 0.14–2.42)
KAPPA LC FREE/LAMBDA FREE UR: 13.33 RATIO (ref 2.04–10.37)
LAMBDA LC FREE UR-MCNC: 0.03 MG/DL (ref 0.02–0.67)
PROT 24H UR-MRATE: 13 MG/D (ref 10–140)
SPECIMEN VOL ?TM UR: 3050 ML

## 2020-01-13 ENCOUNTER — HOSPITAL ENCOUNTER (OUTPATIENT)
Dept: LAB | Facility: MEDICAL CENTER | Age: 30
End: 2020-01-13
Attending: PHYSICIAN ASSISTANT
Payer: COMMERCIAL

## 2020-01-13 DIAGNOSIS — Z79.4 ENCOUNTER FOR LONG-TERM (CURRENT) USE OF INSULIN (HCC): ICD-10-CM

## 2020-01-13 DIAGNOSIS — E10.9 TYPE 1 DIABETES MELLITUS WITHOUT COMPLICATION (HCC): ICD-10-CM

## 2020-01-13 LAB
ALBUMIN SERPL BCP-MCNC: 4.3 G/DL (ref 3.2–4.9)
ALBUMIN/GLOB SERPL: 1.7 G/DL
ALP SERPL-CCNC: 71 U/L (ref 30–99)
ALT SERPL-CCNC: 13 U/L (ref 2–50)
ANION GAP SERPL CALC-SCNC: 10 MMOL/L (ref 0–11.9)
AST SERPL-CCNC: 16 U/L (ref 12–45)
BILIRUB SERPL-MCNC: 0.4 MG/DL (ref 0.1–1.5)
BUN SERPL-MCNC: 9 MG/DL (ref 8–22)
CALCIUM SERPL-MCNC: 9 MG/DL (ref 8.5–10.5)
CHLORIDE SERPL-SCNC: 103 MMOL/L (ref 96–112)
CHOLEST SERPL-MCNC: 159 MG/DL (ref 100–199)
CO2 SERPL-SCNC: 25 MMOL/L (ref 20–33)
CREAT SERPL-MCNC: 0.78 MG/DL (ref 0.5–1.4)
GLOBULIN SER CALC-MCNC: 2.5 G/DL (ref 1.9–3.5)
GLUCOSE SERPL-MCNC: 155 MG/DL (ref 65–99)
HDLC SERPL-MCNC: 98 MG/DL
LDLC SERPL CALC-MCNC: 51 MG/DL
POTASSIUM SERPL-SCNC: 4.3 MMOL/L (ref 3.6–5.5)
PROT SERPL-MCNC: 6.8 G/DL (ref 6–8.2)
SODIUM SERPL-SCNC: 138 MMOL/L (ref 135–145)
TRIGL SERPL-MCNC: 48 MG/DL (ref 0–149)

## 2020-01-13 PROCEDURE — 82043 UR ALBUMIN QUANTITATIVE: CPT

## 2020-01-13 PROCEDURE — 84681 ASSAY OF C-PEPTIDE: CPT

## 2020-01-13 PROCEDURE — 36415 COLL VENOUS BLD VENIPUNCTURE: CPT

## 2020-01-13 PROCEDURE — 86337 INSULIN ANTIBODIES: CPT

## 2020-01-13 PROCEDURE — 80053 COMPREHEN METABOLIC PANEL: CPT

## 2020-01-13 PROCEDURE — 80061 LIPID PANEL: CPT

## 2020-01-13 PROCEDURE — 86341 ISLET CELL ANTIBODY: CPT | Mod: 91

## 2020-01-13 PROCEDURE — 82570 ASSAY OF URINE CREATININE: CPT

## 2020-01-14 LAB
CREAT UR-MCNC: 38.7 MG/DL
MICROALBUMIN UR-MCNC: <0.7 MG/DL
MICROALBUMIN/CREAT UR: NORMAL MG/G (ref 0–30)

## 2020-01-15 LAB
C PEPTIDE SERPL-MCNC: <0.1 NG/ML (ref 0.8–3.5)
PANC ISLET CELL AB TITR SER: NORMAL {TITER}

## 2020-01-16 ENCOUNTER — OFFICE VISIT (OUTPATIENT)
Dept: ENDOCRINOLOGY | Facility: MEDICAL CENTER | Age: 30
End: 2020-01-16
Payer: COMMERCIAL

## 2020-01-16 VITALS
SYSTOLIC BLOOD PRESSURE: 114 MMHG | BODY MASS INDEX: 27.11 KG/M2 | OXYGEN SATURATION: 94 % | HEART RATE: 84 BPM | HEIGHT: 63 IN | DIASTOLIC BLOOD PRESSURE: 62 MMHG | WEIGHT: 153 LBS

## 2020-01-16 DIAGNOSIS — E10.9 TYPE 1 DIABETES MELLITUS WITHOUT COMPLICATION (HCC): ICD-10-CM

## 2020-01-16 DIAGNOSIS — Z79.4 ENCOUNTER FOR LONG-TERM (CURRENT) USE OF INSULIN (HCC): ICD-10-CM

## 2020-01-16 LAB — GAD65 AB SER IA-ACNC: <5 IU/ML (ref 0–5)

## 2020-01-16 PROCEDURE — 99214 OFFICE O/P EST MOD 30 MIN: CPT | Performed by: PHYSICIAN ASSISTANT

## 2020-01-16 NOTE — PROGRESS NOTES
Return to office Patient Consult Note  Referred by: OPAL Grissom    Reason for consult: Diabetes Management Type 1    HPI:  Rachel Esparza is a 29 y.o. old patient who is seeing us today for diabetes care.  This is a pleasant patient with diabetes and I appreciate the opportunity to participate in the care of this patient.    Labs of 1/16/2020 HbA1c is , c-peptide <0.1, ALL Ab Are negative  Labs of 11/18/2019 HbA1c is 8.3  Labs of 2/7/19 microalbumin negative, GFR >60, HDL 63, LDL 71, TSH 0.540,    Labs of 10/25/17 c-peptide 0.2, SHEA-65 negative, isletcell negative,     BG Diary:1/16/2020  In the AM:  No log    1. Type 1 diabetes mellitus without complication (HCC)    This is a new patient with me on 11/18/2019  They are on:  1.  Tresiba 18 at night  2.  Humalog  Carb ratio 1:10 carbs  Correction factor 1:50 above 150    We discussed CGM last visit     2. Encounter for long-term (current) use of insulin (HCC)  Is on a high risk medication Insulin and we will continue to follow          ROS:   Constitutional: No night sweats.  Eyes:  No visual changes.  Cardiac: No chest pain, No palpitations or racing heart rate.  Resp: No shortness of breath, No cough,   Gi: No Diarrhea    All other systems were reviewed and were/are negative.      Past Medical History:  Patient Active Problem List    Diagnosis Date Noted   • Encounter for long-term (current) use of insulin (HCC) 11/18/2019   • Rash 08/23/2019   • Hemochromatosis 10/04/2018   • Type 1 diabetes mellitus without complication (HCC) 05/22/2015       Past Surgical History:  History reviewed. No pertinent surgical history.    Allergies:  Patient has no known allergies.    Social History:  Social History     Socioeconomic History   • Marital status: Single     Spouse name: Not on file   • Number of children: Not on file   • Years of education: Not on file   • Highest education level: Not on file   Occupational History   • Not on file    Social Needs   • Financial resource strain: Not on file   • Food insecurity:     Worry: Not on file     Inability: Not on file   • Transportation needs:     Medical: Not on file     Non-medical: Not on file   Tobacco Use   • Smoking status: Never Smoker   • Smokeless tobacco: Never Used   Substance and Sexual Activity   • Alcohol use: Yes     Alcohol/week: 3.0 oz     Types: 5 Shots of liquor per week     Comment: 1-2 drink daily   • Drug use: Yes     Types: Marijuana, Inhaled     Comment: marijuana   • Sexual activity: Yes     Partners: Male     Birth control/protection: Condom   Lifestyle   • Physical activity:     Days per week: Not on file     Minutes per session: Not on file   • Stress: Not on file   Relationships   • Social connections:     Talks on phone: Not on file     Gets together: Not on file     Attends [employer] service: Not on file     Active member of club or organization: Not on file     Attends meetings of clubs or organizations: Not on file     Relationship status: Not on file   • Intimate partner violence:     Fear of current or ex partner: Not on file     Emotionally abused: Not on file     Physically abused: Not on file     Forced sexual activity: Not on file   Other Topics Concern   • Not on file   Social History Narrative   • Not on file       Family History:  Family History   Problem Relation Age of Onset   • Hypertension Father    • Diabetes Maternal Uncle    • Diabetes Paternal Aunt    • Diabetes Paternal Uncle    • Arthritis Maternal Grandmother         Rheumatoid arthritis       Medications:    Current Outpatient Medications:   •  Insulin Degludec (TRESIBA FLEXTOUCH) 200 UNIT/ML Solution Pen-injector, Inject 60 Units as instructed every bedtime., Disp: 3 PEN, Rfl: 11  •  triamcinolone acetonide (KENALOG) 0.1 % Cream, Apply 1 Application to affected area(s) 2 times a day., Disp: 1 Tube, Rfl: 3  •  Insulin Pen Needle 32 G x 4 mm (NOVOFINE PLUS), 1 Applicator by Does not apply route 6  "Times a Day. Using one needle tip with tresiba novolog per day, Disp: 150 Each, Rfl: 11  •  insulin lispro, Human, (HUMALOG) 100 UNIT/ML injection PEN, Inject 1 Unit per 10 gm of carbohydrates before meals, Disp: 5 PEN, Rfl: 1  •  clotrimazole-betamethasone (LOTRISONE) 1-0.05 % Cream, Apply 1 Application to affected area(s) 2 times a day., Disp: 1 Tube, Rfl: 0  •  B-D ULTRAFINE III SHORT PEN 31G X 8 MM MISC, 1 Each every bedtime., Disp: , Rfl:         Physical Examination:   Vital signs: /62 (BP Location: Left arm, Patient Position: Sitting)   Pulse 84   Ht 1.6 m (5' 3\")   Wt 69.4 kg (153 lb)   SpO2 94%   BMI 27.10 kg/m²   General: No distress, cooperative, well dressed and well nourished.   Eyes: No scleral icterus or discharge, No hyposphagma  ENMT: Normal on external inspection of nose, lips, No nasal drainage   Neck: No abnormal masses on inspection  Resp: Normal effort, Bilateral clear to auscultation, No wheezing, No rales  CVS: Regular rate and rhythm, S1 S2 normal, No murmur. No gallop  Extremities: No edema bilateral extremities  Neuro: Alert and oriented  Skin: No rash, No Ulcers  Psych: Normal mood and affect      Assessment and Plan:    1. Type 1 diabetes mellitus without complication (HCC)  They are on:  1.  Tresiba 18 at night  2.  Humalog  Carb ratio 1:10 carbs  Correction factor 1:50 above 150    We discussed CGM last visit     2. Encounter for long-term (current) use of insulin (HCC)  Is on a high risk medication Insulin and we will continue to follow     Return in about 3 months (around 4/16/2020).      Thank you kindly for allowing me to participate in the diabetes care plan for this patient.    Luc Wilks PA-C, BC-ADM  Board Certified - [facility] Diabetes [facility]  01/16/20    CC:   OPAL Grissom    "

## 2020-01-19 LAB — INSULIN HUMAN AB SER-ACNC: 8.9 U/ML (ref 0–0.4)

## 2020-02-27 ENCOUNTER — TELEPHONE (OUTPATIENT)
Dept: ENDOCRINOLOGY | Facility: MEDICAL CENTER | Age: 30
End: 2020-02-27

## 2020-02-28 RX ORDER — INSULIN LISPRO 100 [IU]/ML
16 INJECTION, SOLUTION INTRAVENOUS; SUBCUTANEOUS
Qty: 5 PEN | Refills: 11 | Status: ON HOLD | OUTPATIENT
Start: 2020-02-28 | End: 2021-08-06

## 2020-03-11 ENCOUNTER — OFFICE VISIT (OUTPATIENT)
Dept: URGENT CARE | Facility: CLINIC | Age: 30
End: 2020-03-11
Payer: COMMERCIAL

## 2020-03-11 VITALS
SYSTOLIC BLOOD PRESSURE: 118 MMHG | TEMPERATURE: 98.9 F | DIASTOLIC BLOOD PRESSURE: 80 MMHG | OXYGEN SATURATION: 97 % | BODY MASS INDEX: 26.75 KG/M2 | HEART RATE: 100 BPM | WEIGHT: 151 LBS | HEIGHT: 63 IN

## 2020-03-11 DIAGNOSIS — H66.002 ACUTE SUPPURATIVE OTITIS MEDIA OF LEFT EAR WITHOUT SPONTANEOUS RUPTURE OF TYMPANIC MEMBRANE, RECURRENCE NOT SPECIFIED: ICD-10-CM

## 2020-03-11 PROBLEM — M70.40 PREPATELLAR BURSITIS: Status: ACTIVE | Noted: 2020-02-25

## 2020-03-11 PROCEDURE — 99214 OFFICE O/P EST MOD 30 MIN: CPT | Performed by: PHYSICIAN ASSISTANT

## 2020-03-11 RX ORDER — METHYLPREDNISOLONE 4 MG/1
TABLET ORAL
Qty: 21 TAB | Refills: 0 | Status: ON HOLD | OUTPATIENT
Start: 2020-03-11 | End: 2021-08-06

## 2020-03-11 RX ORDER — CEFDINIR 300 MG/1
300 CAPSULE ORAL 2 TIMES DAILY
Qty: 20 CAP | Refills: 0 | Status: SHIPPED | OUTPATIENT
Start: 2020-03-11 | End: 2020-03-21

## 2020-03-11 ASSESSMENT — FIBROSIS 4 INDEX: FIB4 SCORE: 0.44

## 2020-03-11 NOTE — LETTER
March 11, 2020       Patient: Rachel Esparza   YOB: 1990   Date of Visit: 3/11/2020         To Whom It May Concern:    It is my medical opinion that Rachel Esparza may be excused from class/school for the dates of 3/11/20-3/13/20.      If you have any questions or concerns, please don't hesitate to call [phone]          Sincerely,          Isaac Ferrrao P.A.-C.  Electronically Signed

## 2020-03-11 NOTE — PROGRESS NOTES
Subjective:      Pt is a 29 y.o. female who presents with Sore Throat (only in the morning, poss sinus inf, right ear plugged, left ear painful, x yesterday)            HPI  PT presents to  clinic today complaining of sore throat, watery eyes, pressure in ears, cough, fatigue, runny nose, post nasal drip, sinus pressure and headache. PT denies CP, SOB, NVD, abdominal pain, joint pain. PT states these symptoms began around 1-2 days ago. PT states the pain is a 4/10, aching in nature and worse at night.  Pt has not taken any RX medications for this condition. The pt's medication list, problem list, and allergies have been evaluated and reviewed during today's visit.    PMH:  Past Medical History:   Diagnosis Date   • Diabetes (HCC)    • Hyperlipidemia        PSH:  Negative per pt.      Fam Hx:    family history includes Arthritis in her maternal grandmother; Diabetes in her maternal uncle, paternal aunt, and paternal uncle; Hypertension in her father.  Family Status   Relation Name Status   • Fa  Alive   • Mo  Alive   • Sis  Alive   • Bro  Alive   • Sis  Alive   • Sis  Alive   • Bro  Alive   • MUnc  (Not Specified)   • PAunt  (Not Specified)   • PUnc  (Not Specified)   • MGMo  (Not Specified)       Soc HX:  Social History     Socioeconomic History   • Marital status: Single     Spouse name: Not on file   • Number of children: Not on file   • Years of education: Not on file   • Highest education level: Not on file   Occupational History   • Not on file   Social Needs   • Financial resource strain: Not on file   • Food insecurity     Worry: Not on file     Inability: Not on file   • Transportation needs     Medical: Not on file     Non-medical: Not on file   Tobacco Use   • Smoking status: Never Smoker   • Smokeless tobacco: Never Used   Substance and Sexual Activity   • Alcohol use: Yes     Alcohol/week: 3.0 oz     Types: 5 Shots of liquor per week     Comment: 1-2 drink daily   • Drug use: Yes     Types: Marijuana,  Inhaled     Comment: marijuana   • Sexual activity: Yes     Partners: Male     Birth control/protection: Condom   Lifestyle   • Physical activity     Days per week: Not on file     Minutes per session: Not on file   • Stress: Not on file   Relationships   • Social connections     Talks on phone: Not on file     Gets together: Not on file     Attends Mandaeism service: Not on file     Active member of club or organization: Not on file     Attends meetings of clubs or organizations: Not on file     Relationship status: Not on file   • Intimate partner violence     Fear of current or ex partner: Not on file     Emotionally abused: Not on file     Physically abused: Not on file     Forced sexual activity: Not on file   Other Topics Concern   • Not on file   Social History Narrative   • Not on file         Medications:    Current Outpatient Medications:   •  methylPREDNISolone (MEDROL DOSEPAK) 4 MG Tablet Therapy Pack, Follow schedule on package instructions., Disp: 21 Tab, Rfl: 0  •  cefdinir (OMNICEF) 300 MG Cap, Take 1 Cap by mouth 2 times a day for 10 days., Disp: 20 Cap, Rfl: 0  •  insulin lispro (HUMALOG KWIKPEN) 100 UNIT/ML Solution Pen-injector injection PEN, Inject 16 Units as instructed 3 times a day before meals., Disp: 5 PEN, Rfl: 11  •  Insulin Degludec (TRESIBA FLEXTOUCH) 200 UNIT/ML Solution Pen-injector, Inject 60 Units as instructed every bedtime., Disp: 3 PEN, Rfl: 11  •  triamcinolone acetonide (KENALOG) 0.1 % Cream, Apply 1 Application to affected area(s) 2 times a day., Disp: 1 Tube, Rfl: 3  •  Insulin Pen Needle 32 G x 4 mm (NOVOFINE PLUS), 1 Applicator by Does not apply route 6 Times a Day. Using one needle tip with tresiba novolog per day, Disp: 150 Each, Rfl: 11  •  clotrimazole-betamethasone (LOTRISONE) 1-0.05 % Cream, Apply 1 Application to affected area(s) 2 times a day., Disp: 1 Tube, Rfl: 0  •  B-D ULTRAFINE III SHORT PEN 31G X 8 MM MISC, 1 Each every bedtime., Disp: , Rfl:  "      Allergies:  Patient has no known allergies.    ROS  Review of Systems   Constitutional: Positive for malaise/fatigue. Negative for fever.   HENT: Positive for congestion and sore throat from postnasal drip with associated sinus pressure and fullness.    Eyes: Negative for blurred vision, double vision and photophobia.   Respiratory:  Negative for cough, shortness of breath and wheezing.    Cardiovascular: Negative for chest pain and palpitations.   Gastrointestinal: Negative for nausea, vomiting, abdominal pain, diarrhea and constipation.   Genitourinary: Negative for dysuria and flank pain.   Musculoskeletal: Negative for joint pain and myalgias.   Skin: Negative for itching and rash.   Neurological:  Negative for dizziness and tingling.   Endo/Heme/Allergies: Does not bruise/bleed easily.   Psychiatric/Behavioral: Negative for depression. The patient is not nervous/anxious.           Objective:     /80 (BP Location: Left arm, Patient Position: Sitting, BP Cuff Size: Adult)   Pulse 100   Temp 37.2 °C (98.9 °F) (Tympanic)   Ht 1.6 m (5' 3\")   Wt 68.5 kg (151 lb)   SpO2 97%   BMI 26.75 kg/m²      Physical Exam        Physical Exam   Constitutional: Pt is oriented to person, place, and time. Pt appears well-developed and well-nourished. No distress.   HENT:   Head: Normocephalic and atraumatic.   Right Ear: Hearing, tympanic membrane, external ear and ear canal normal.   Left Ear: Hearing, external ear and ear canal normal. Tympanic membrane is erythematous and bulging. A middle ear effusion is present.   Nose: Mucosal edema, rhinorrhea and sinus tenderness present. No nose lacerations, nasal deformity, septal deviation or nasal septal hematoma. No epistaxis.  No foreign bodies. Right sinus exhibits maxillary sinus tenderness and frontal sinus tenderness. Left sinus exhibits maxillary sinus tenderness and frontal sinus tenderness.   Mouth/Throat: Oropharynx is clear and moist. No oropharyngeal " exudate.   Eyes: Conjunctivae normal and EOM are normal. Pupils are equal, round, and reactive to light. Right eye exhibits no discharge. Left eye exhibits no discharge.   Neck: Normal range of motion. Neck supple. No tracheal deviation present. No thyromegaly present.   Cardiovascular: Normal rate, regular rhythm, normal heart sounds and intact distal pulses.  Exam reveals no gallop and no friction rub.    No murmur heard.  Pulmonary/Chest: Effort normal and breath sounds normal. No respiratory distress. Pt has no wheezes. Pt has no rales. Pt exhibits no tenderness.   Abdominal: Soft. Bowel sounds are normal. Pt exhibits no distension and no mass. There is no tenderness. There is no rebound and no guarding.   Musculoskeletal: Normal range of motion. Pt exhibits no edema and no tenderness.   Lymphadenopathy:     Pt has no cervical adenopathy.   Neurological: Pt is alert and oriented to person, place, and time. Pt has normal reflexes. Pt displays normal reflexes. No cranial nerve deficit. Pt exhibits normal muscle tone. Coordination normal.   Skin: Skin is warm and dry. No rash noted. No erythema.   Psychiatric: Pt has a normal mood and affect. Pt behavior is normal. Judgment and thought content normal.            Assessment/Plan:       1. Acute suppurative otitis media of left ear without spontaneous rupture of tympanic membrane, recurrence not specified    - methylPREDNISolone (MEDROL DOSEPAK) 4 MG Tablet Therapy Pack; Follow schedule on package instructions.  Dispense: 21 Tab; Refill: 0  - cefdinir (OMNICEF) 300 MG Cap; Take 1 Cap by mouth 2 times a day for 10 days.  Dispense: 20 Cap; Refill: 0      Rest, fluids encouraged.  OTC decongestant for congestion  Note given for work.  AVS with medical info given.  Pt was in full understanding and agreement with the plan.  Differential diagnosis, natural history, supportive care, and indications for immediate follow-up discussed. All questions answered. Patient agrees  with the plan of care.  Follow-up as needed if symptoms worsen or fail to improve to PCP, Urgent care or Emergency Room.

## 2020-07-17 ENCOUNTER — HOSPITAL ENCOUNTER (OUTPATIENT)
Dept: LAB | Facility: MEDICAL CENTER | Age: 30
End: 2020-07-17
Attending: INTERNAL MEDICINE
Payer: COMMERCIAL

## 2020-07-17 LAB
ALBUMIN SERPL BCP-MCNC: 4.6 G/DL (ref 3.2–4.9)
ALBUMIN/GLOB SERPL: 2 G/DL
ALP SERPL-CCNC: 65 U/L (ref 30–99)
ALT SERPL-CCNC: 17 U/L (ref 2–50)
ANION GAP SERPL CALC-SCNC: 12 MMOL/L (ref 7–16)
AST SERPL-CCNC: 19 U/L (ref 12–45)
BASOPHILS # BLD AUTO: 0.9 % (ref 0–1.8)
BASOPHILS # BLD: 0.04 K/UL (ref 0–0.12)
BILIRUB SERPL-MCNC: 0.2 MG/DL (ref 0.1–1.5)
BUN SERPL-MCNC: 12 MG/DL (ref 8–22)
CALCIUM SERPL-MCNC: 9.3 MG/DL (ref 8.5–10.5)
CHLORIDE SERPL-SCNC: 103 MMOL/L (ref 96–112)
CO2 SERPL-SCNC: 23 MMOL/L (ref 20–33)
CREAT SERPL-MCNC: 0.78 MG/DL (ref 0.5–1.4)
EOSINOPHIL # BLD AUTO: 0.11 K/UL (ref 0–0.51)
EOSINOPHIL NFR BLD: 2.5 % (ref 0–6.9)
ERYTHROCYTE [DISTWIDTH] IN BLOOD BY AUTOMATED COUNT: 42.9 FL (ref 35.9–50)
FERRITIN SERPL-MCNC: 67.1 NG/ML (ref 10–291)
GLOBULIN SER CALC-MCNC: 2.3 G/DL (ref 1.9–3.5)
GLUCOSE SERPL-MCNC: 74 MG/DL (ref 65–99)
HCT VFR BLD AUTO: 42.8 % (ref 37–47)
HGB BLD-MCNC: 14 G/DL (ref 12–16)
IMM GRANULOCYTES # BLD AUTO: 0.01 K/UL (ref 0–0.11)
IMM GRANULOCYTES NFR BLD AUTO: 0.2 % (ref 0–0.9)
LYMPHOCYTES # BLD AUTO: 1.59 K/UL (ref 1–4.8)
LYMPHOCYTES NFR BLD: 36.2 % (ref 22–41)
MCH RBC QN AUTO: 32.3 PG (ref 27–33)
MCHC RBC AUTO-ENTMCNC: 32.7 G/DL (ref 33.6–35)
MCV RBC AUTO: 98.6 FL (ref 81.4–97.8)
MONOCYTES # BLD AUTO: 0.35 K/UL (ref 0–0.85)
MONOCYTES NFR BLD AUTO: 8 % (ref 0–13.4)
NEUTROPHILS # BLD AUTO: 2.29 K/UL (ref 2–7.15)
NEUTROPHILS NFR BLD: 52.2 % (ref 44–72)
NRBC # BLD AUTO: 0 K/UL
NRBC BLD-RTO: 0 /100 WBC
PLATELET # BLD AUTO: 317 K/UL (ref 164–446)
PMV BLD AUTO: 9.7 FL (ref 9–12.9)
POTASSIUM SERPL-SCNC: 4 MMOL/L (ref 3.6–5.5)
PROT SERPL-MCNC: 6.9 G/DL (ref 6–8.2)
RBC # BLD AUTO: 4.34 M/UL (ref 4.2–5.4)
SODIUM SERPL-SCNC: 138 MMOL/L (ref 135–145)
WBC # BLD AUTO: 4.4 K/UL (ref 4.8–10.8)

## 2020-07-17 PROCEDURE — 80053 COMPREHEN METABOLIC PANEL: CPT

## 2020-07-17 PROCEDURE — 82728 ASSAY OF FERRITIN: CPT

## 2020-07-17 PROCEDURE — 84155 ASSAY OF PROTEIN SERUM: CPT

## 2020-07-17 PROCEDURE — 36415 COLL VENOUS BLD VENIPUNCTURE: CPT

## 2020-07-17 PROCEDURE — 83520 IMMUNOASSAY QUANT NOS NONAB: CPT | Mod: 91

## 2020-07-17 PROCEDURE — 82232 ASSAY OF BETA-2 PROTEIN: CPT

## 2020-07-17 PROCEDURE — 84165 PROTEIN E-PHORESIS SERUM: CPT

## 2020-07-17 PROCEDURE — 84156 ASSAY OF PROTEIN URINE: CPT

## 2020-07-17 PROCEDURE — 85025 COMPLETE CBC W/AUTO DIFF WBC: CPT

## 2020-07-17 PROCEDURE — 82784 ASSAY IGA/IGD/IGG/IGM EACH: CPT

## 2020-07-17 PROCEDURE — 86335 IMMUNFIX E-PHORSIS/URINE/CSF: CPT

## 2020-07-20 LAB
B2 MICROGLOB SERPL-MCNC: 1.3 MG/L (ref 1.1–2.4)
IGA SERPL-MCNC: 330 MG/DL (ref 68–408)
IGG SERPL-MCNC: 766 MG/DL (ref 768–1632)
IGM SERPL-MCNC: 76 MG/DL (ref 35–263)
KAPPA LC FREE SER-MCNC: 9.97 MG/L (ref 3.3–19.4)
KAPPA LC FREE/LAMBDA FREE SER NEPH: 1.04 {RATIO} (ref 0.26–1.65)
LAMBDA LC FREE SERPL-MCNC: 9.6 MG/L (ref 5.71–26.3)

## 2020-07-21 LAB
ALBUMIN SERPL ELPH-MCNC: 4.24 G/DL (ref 3.75–5.01)
ALPHA1 GLOB SERPL ELPH-MCNC: 0.26 G/DL (ref 0.19–0.46)
ALPHA2 GLOB SERPL ELPH-MCNC: 0.67 G/DL (ref 0.48–1.05)
B-GLOBULIN SERPL ELPH-MCNC: 0.85 G/DL (ref 0.48–1.1)
COLLECT DURATION TIME SPEC: NORMAL HRS
EER PROT ELECT SER Q1092: NORMAL
GAMMA GLOB SERPL ELPH-MCNC: 0.79 G/DL (ref 0.62–1.51)
INTERPRETATION SERPL IFE-IMP: NORMAL
INTERPRETATION UR IFE-IMP: NORMAL
KAPPA LC FREE 24H UR-MRATE: NORMAL MG/D
KAPPA LC FREE UR-MCNC: 0.86 MG/L (ref 0–32.9)
LAMBDA LC FREE 24H UR-MRATE: NORMAL MG/D
LAMBDA LC FREE UR-MCNC: <0.74 MG/L (ref 0–3.79)
PROT 24H UR-MRATE: NORMAL MG/D
PROT SERPL-MCNC: 6.8 G/DL (ref 6.3–8.2)
SPECIMEN VOL ?TM UR: NORMAL ML

## 2021-04-08 ENCOUNTER — HOSPITAL ENCOUNTER (OUTPATIENT)
Dept: LAB | Facility: MEDICAL CENTER | Age: 31
End: 2021-04-08
Attending: INTERNAL MEDICINE
Payer: COMMERCIAL

## 2021-04-08 ENCOUNTER — HOSPITAL ENCOUNTER (OUTPATIENT)
Dept: LAB | Facility: MEDICAL CENTER | Age: 31
End: 2021-04-08
Attending: PHYSICIAN ASSISTANT
Payer: COMMERCIAL

## 2021-04-08 LAB
ALBUMIN SERPL BCP-MCNC: 4.7 G/DL (ref 3.2–4.9)
ALBUMIN SERPL BCP-MCNC: 4.7 G/DL (ref 3.2–4.9)
ALBUMIN/GLOB SERPL: 1.6 G/DL
ALBUMIN/GLOB SERPL: 1.7 G/DL
ALP SERPL-CCNC: 91 U/L (ref 30–99)
ALP SERPL-CCNC: 91 U/L (ref 30–99)
ALT SERPL-CCNC: 14 U/L (ref 2–50)
ALT SERPL-CCNC: 19 U/L (ref 2–50)
ANION GAP SERPL CALC-SCNC: 10 MMOL/L (ref 7–16)
ANION GAP SERPL CALC-SCNC: 10 MMOL/L (ref 7–16)
AST SERPL-CCNC: 14 U/L (ref 12–45)
AST SERPL-CCNC: 16 U/L (ref 12–45)
BASOPHILS # BLD AUTO: 0.7 % (ref 0–1.8)
BASOPHILS # BLD: 0.05 K/UL (ref 0–0.12)
BILIRUB SERPL-MCNC: 0.7 MG/DL (ref 0.1–1.5)
BILIRUB SERPL-MCNC: 0.8 MG/DL (ref 0.1–1.5)
BUN SERPL-MCNC: 10 MG/DL (ref 8–22)
BUN SERPL-MCNC: 11 MG/DL (ref 8–22)
CALCIUM SERPL-MCNC: 9.5 MG/DL (ref 8.5–10.5)
CALCIUM SERPL-MCNC: 9.5 MG/DL (ref 8.5–10.5)
CHLORIDE SERPL-SCNC: 100 MMOL/L (ref 96–112)
CHLORIDE SERPL-SCNC: 99 MMOL/L (ref 96–112)
CHOLEST SERPL-MCNC: 197 MG/DL (ref 100–199)
CO2 SERPL-SCNC: 23 MMOL/L (ref 20–33)
CO2 SERPL-SCNC: 23 MMOL/L (ref 20–33)
CREAT SERPL-MCNC: 0.67 MG/DL (ref 0.5–1.4)
CREAT SERPL-MCNC: 0.77 MG/DL (ref 0.5–1.4)
CREAT UR-MCNC: 85.55 MG/DL
EOSINOPHIL # BLD AUTO: 0.12 K/UL (ref 0–0.51)
EOSINOPHIL NFR BLD: 1.6 % (ref 0–6.9)
ERYTHROCYTE [DISTWIDTH] IN BLOOD BY AUTOMATED COUNT: 42.5 FL (ref 35.9–50)
FASTING STATUS PATIENT QL REPORTED: NORMAL
FERRITIN SERPL-MCNC: 97.4 NG/ML (ref 10–291)
GLOBULIN SER CALC-MCNC: 2.8 G/DL (ref 1.9–3.5)
GLOBULIN SER CALC-MCNC: 2.9 G/DL (ref 1.9–3.5)
GLUCOSE SERPL-MCNC: 199 MG/DL (ref 65–99)
GLUCOSE SERPL-MCNC: 201 MG/DL (ref 65–99)
HCT VFR BLD AUTO: 44.3 % (ref 37–47)
HDLC SERPL-MCNC: 109 MG/DL
HGB BLD-MCNC: 14.8 G/DL (ref 12–16)
IMM GRANULOCYTES # BLD AUTO: 0.01 K/UL (ref 0–0.11)
IMM GRANULOCYTES NFR BLD AUTO: 0.1 % (ref 0–0.9)
LDLC SERPL CALC-MCNC: 73 MG/DL
LYMPHOCYTES # BLD AUTO: 1.75 K/UL (ref 1–4.8)
LYMPHOCYTES NFR BLD: 23.7 % (ref 22–41)
MCH RBC QN AUTO: 32.4 PG (ref 27–33)
MCHC RBC AUTO-ENTMCNC: 33.4 G/DL (ref 33.6–35)
MCV RBC AUTO: 96.9 FL (ref 81.4–97.8)
MICROALBUMIN UR-MCNC: <1.2 MG/DL
MICROALBUMIN/CREAT UR: NORMAL MG/G (ref 0–30)
MONOCYTES # BLD AUTO: 0.54 K/UL (ref 0–0.85)
MONOCYTES NFR BLD AUTO: 7.3 % (ref 0–13.4)
NEUTROPHILS # BLD AUTO: 4.9 K/UL (ref 2–7.15)
NEUTROPHILS NFR BLD: 66.6 % (ref 44–72)
NRBC # BLD AUTO: 0 K/UL
NRBC BLD-RTO: 0 /100 WBC
PLATELET # BLD AUTO: 276 K/UL (ref 164–446)
PMV BLD AUTO: 9.9 FL (ref 9–12.9)
POTASSIUM SERPL-SCNC: 4.4 MMOL/L (ref 3.6–5.5)
POTASSIUM SERPL-SCNC: 4.5 MMOL/L (ref 3.6–5.5)
PROT SERPL-MCNC: 7.5 G/DL (ref 6–8.2)
PROT SERPL-MCNC: 7.6 G/DL (ref 6–8.2)
RBC # BLD AUTO: 4.57 M/UL (ref 4.2–5.4)
SODIUM SERPL-SCNC: 132 MMOL/L (ref 135–145)
SODIUM SERPL-SCNC: 133 MMOL/L (ref 135–145)
T4 FREE SERPL-MCNC: 1.13 NG/DL (ref 0.93–1.7)
TRIGL SERPL-MCNC: 75 MG/DL (ref 0–149)
TSH SERPL DL<=0.005 MIU/L-ACNC: 0.69 UIU/ML (ref 0.38–5.33)
WBC # BLD AUTO: 7.4 K/UL (ref 4.8–10.8)

## 2021-04-08 PROCEDURE — 86334 IMMUNOFIX E-PHORESIS SERUM: CPT

## 2021-04-08 PROCEDURE — 80053 COMPREHEN METABOLIC PANEL: CPT

## 2021-04-08 PROCEDURE — 85025 COMPLETE CBC W/AUTO DIFF WBC: CPT

## 2021-04-08 PROCEDURE — 82784 ASSAY IGA/IGD/IGG/IGM EACH: CPT

## 2021-04-08 PROCEDURE — 80061 LIPID PANEL: CPT

## 2021-04-08 PROCEDURE — 82785 ASSAY OF IGE: CPT

## 2021-04-08 PROCEDURE — 36415 COLL VENOUS BLD VENIPUNCTURE: CPT

## 2021-04-08 PROCEDURE — 82570 ASSAY OF URINE CREATININE: CPT

## 2021-04-08 PROCEDURE — 82728 ASSAY OF FERRITIN: CPT

## 2021-04-08 PROCEDURE — 83520 IMMUNOASSAY QUANT NOS NONAB: CPT | Mod: 91

## 2021-04-08 PROCEDURE — 84443 ASSAY THYROID STIM HORMONE: CPT

## 2021-04-08 PROCEDURE — 84155 ASSAY OF PROTEIN SERUM: CPT

## 2021-04-08 PROCEDURE — 80053 COMPREHEN METABOLIC PANEL: CPT | Mod: 91

## 2021-04-08 PROCEDURE — 84156 ASSAY OF PROTEIN URINE: CPT

## 2021-04-08 PROCEDURE — 84439 ASSAY OF FREE THYROXINE: CPT

## 2021-04-08 PROCEDURE — 82043 UR ALBUMIN QUANTITATIVE: CPT

## 2021-04-08 PROCEDURE — 82232 ASSAY OF BETA-2 PROTEIN: CPT

## 2021-04-08 PROCEDURE — 84165 PROTEIN E-PHORESIS SERUM: CPT

## 2021-04-10 LAB — B2 MICROGLOB SERPL-MCNC: 1.2 MG/L (ref 1.1–2.4)

## 2021-04-11 LAB — IGE SERPL-ACNC: 138 KU/L

## 2021-04-12 LAB
ALBUMIN SERPL ELPH-MCNC: 4.39 G/DL (ref 3.75–5.01)
ALPHA1 GLOB SERPL ELPH-MCNC: 0.27 G/DL (ref 0.19–0.46)
ALPHA2 GLOB SERPL ELPH-MCNC: 0.64 G/DL (ref 0.48–1.05)
B-GLOBULIN SERPL ELPH-MCNC: 0.87 G/DL (ref 0.48–1.1)
EER MONOCLONAL PROTEIN AND FLC, SERUM Q5224: NORMAL
GAMMA GLOB SERPL ELPH-MCNC: 0.83 G/DL (ref 0.62–1.51)
IGA SERPL-MCNC: 341 MG/DL (ref 68–408)
IGG SERPL-MCNC: 812 MG/DL (ref 768–1632)
IGM SERPL-MCNC: 89 MG/DL (ref 35–263)
INTERPRETATION SERPL IFE-IMP: NORMAL
INTERPRETATION SERPL IFE-IMP: NORMAL
KAPPA LC FREE SER-MCNC: 10.14 MG/L (ref 3.3–19.4)
KAPPA LC FREE/LAMBDA FREE SER NEPH: 1.12 {RATIO} (ref 0.26–1.65)
LAMBDA LC FREE SERPL-MCNC: 9.07 MG/L (ref 5.71–26.3)
PROT SERPL-MCNC: 7 G/DL (ref 6.3–8.2)

## 2021-04-13 LAB
KAPPA LC FREE 24H UR-MRATE: NORMAL MG/D
KAPPA LC FREE UR-MCNC: 15.59 MG/L (ref 0–32.9)
LAMBDA LC FREE 24H UR-MRATE: NORMAL MG/D
LAMBDA LC FREE UR-MCNC: 2.05 MG/L (ref 0–3.79)
PROT 24H UR-MRATE: NORMAL MG/D

## 2021-04-15 LAB
ALBUMIN 24H MFR UR ELPH: 46.8 %
ALPHA1 GLOB 24H MFR UR ELPH: 14.1 %
ALPHA2 GLOB 24H MFR UR ELPH: 21.1 %
B-GLOBULIN 24H MFR UR ELPH: 16.9 %
COLLECT DURATION TIME SPEC: NORMAL HRS
EER MONOCLONAL PROTEIN STUDY, 24 HOUR U Q5964: NORMAL
GAMMA GLOB 24H MFR UR ELPH: 1.1 %
INTERPRETATION UR IFE-IMP: NORMAL
M PROTEIN 24H MFR UR ELPH: 0 %
M PROTEIN 24H UR ELPH-MRATE: NORMAL MG/24 HRS
PROT 24H UR-MRATE: NORMAL MG/D (ref 40–150)
PROT UR-MCNC: 6 MG/DL
SPECIMEN VOL ?TM UR: NORMAL ML

## 2021-07-01 ENCOUNTER — HOSPITAL ENCOUNTER (OUTPATIENT)
Dept: RADIOLOGY | Facility: MEDICAL CENTER | Age: 31
End: 2021-07-01
Attending: PHYSICIAN ASSISTANT
Payer: COMMERCIAL

## 2021-07-01 ENCOUNTER — OFFICE VISIT (OUTPATIENT)
Dept: URGENT CARE | Facility: CLINIC | Age: 31
End: 2021-07-01
Payer: COMMERCIAL

## 2021-07-01 VITALS
SYSTOLIC BLOOD PRESSURE: 124 MMHG | DIASTOLIC BLOOD PRESSURE: 70 MMHG | HEART RATE: 70 BPM | WEIGHT: 158 LBS | TEMPERATURE: 97.7 F | OXYGEN SATURATION: 100 % | HEIGHT: 63 IN | RESPIRATION RATE: 16 BRPM | BODY MASS INDEX: 28 KG/M2

## 2021-07-01 DIAGNOSIS — R19.00 MASS OF SOFT TISSUE OF ABDOMEN: ICD-10-CM

## 2021-07-01 DIAGNOSIS — L02.211 ABSCESS OF ABDOMINAL WALL: ICD-10-CM

## 2021-07-01 PROCEDURE — 99214 OFFICE O/P EST MOD 30 MIN: CPT | Performed by: PHYSICIAN ASSISTANT

## 2021-07-01 PROCEDURE — 76705 ECHO EXAM OF ABDOMEN: CPT

## 2021-07-01 RX ORDER — SULFAMETHOXAZOLE AND TRIMETHOPRIM 800; 160 MG/1; MG/1
1 TABLET ORAL 2 TIMES DAILY
Qty: 14 TABLET | Refills: 0 | Status: SHIPPED | OUTPATIENT
Start: 2021-07-01 | End: 2021-07-08

## 2021-07-01 ASSESSMENT — ENCOUNTER SYMPTOMS
SORE THROAT: 0
COUGH: 0
EYE REDNESS: 0
VOMITING: 0
SHORTNESS OF BREATH: 0
NAUSEA: 0
EYE DISCHARGE: 0
HEADACHES: 0
FEVER: 0

## 2021-07-01 ASSESSMENT — FIBROSIS 4 INDEX: FIB4 SCORE: 0.5

## 2021-07-01 NOTE — PROGRESS NOTES
"Subjective:      Rachel Esparza is a 31 y.o. female who presents with Bump (above belly button x a few months and pain has been getting worse lately.)            HPI    This is a new problem.   The patient presents to clinic complaining of a bump to her abdomen near the bellybutton x2-3 months.  The patient states the bump has slightly increased in size since onset.  The patient states the bump is intermittently tender.  The patient states the bump to her abdomen will turn slightly red throughout the day.  The patient states the bump becomes irritated when wearing high wasted jeans or after doing an ab workout.  The patient states the bump is not reducible.  The patient reports no associated swelling.  No increased warmth.  No discharge/drainage.  She also reports no abdominal pain.  No nausea/vomiting.  No fever.  The patient states that she is a type I diabetic.  The patient states she accidentally injected insulin near the area of the pump, and reports a \"burning\" pain.  The patient has not taken any OTC medications for her current symptoms.  She reports no history of same.    PMH:  has a past medical history of Diabetes (HCC) and Hyperlipidemia.  MEDS:   Current Outpatient Medications:   •  insulin lispro (HUMALOG KWIKPEN) 100 UNIT/ML Solution Pen-injector injection PEN, Inject 16 Units as instructed 3 times a day before meals., Disp: 5 PEN, Rfl: 11  •  Insulin Degludec (TRESIBA FLEXTOUCH) 200 UNIT/ML Solution Pen-injector, Inject 60 Units as instructed every bedtime., Disp: 3 PEN, Rfl: 11  •  methylPREDNISolone (MEDROL DOSEPAK) 4 MG Tablet Therapy Pack, Follow schedule on package instructions., Disp: 21 Tab, Rfl: 0  •  triamcinolone acetonide (KENALOG) 0.1 % Cream, Apply 1 Application to affected area(s) 2 times a day., Disp: 1 Tube, Rfl: 3  •  Insulin Pen Needle 32 G x 4 mm (NOVOFINE PLUS), 1 Applicator by Does not apply route 6 Times a Day. Using one needle tip with tresiba novolog per day, " "Disp: 150 Each, Rfl: 11  •  clotrimazole-betamethasone (LOTRISONE) 1-0.05 % Cream, Apply 1 Application to affected area(s) 2 times a day., Disp: 1 Tube, Rfl: 0  •  B-D ULTRAFINE III SHORT PEN 31G X 8 MM MISC, 1 Each every bedtime., Disp: , Rfl:   ALLERGIES: No Known Allergies  SURGHX: No past surgical history on file.  SOCHX:  reports that she has never smoked. She has never used smokeless tobacco. She reports current alcohol use of about 3.0 oz of alcohol per week. She reports current drug use. Drugs: Marijuana and Inhaled.  FH: Family history was reviewed, no pertinent findings to report      Review of Systems   Constitutional: Negative for fever.   HENT: Negative for congestion, ear pain and sore throat.    Eyes: Negative for discharge and redness.   Respiratory: Negative for cough and shortness of breath.    Cardiovascular: Negative for chest pain and leg swelling.   Gastrointestinal: Negative for nausea and vomiting.   Musculoskeletal: Negative for joint pain.   Skin: Negative for rash.        + bump to abdomen   Neurological: Negative for headaches.          Objective:     /70 (BP Location: Left arm, Patient Position: Sitting, BP Cuff Size: Adult long)   Pulse 70   Temp 36.5 °C (97.7 °F) (Temporal)   Resp 16   Ht 1.6 m (5' 3\")   Wt 71.7 kg (158 lb)   SpO2 100%   BMI 27.99 kg/m²      Physical Exam  Constitutional:       General: She is not in acute distress.     Appearance: Normal appearance. She is not ill-appearing.   HENT:      Head: Normocephalic and atraumatic.      Right Ear: External ear normal.      Left Ear: External ear normal.   Eyes:      Extraocular Movements: Extraocular movements intact.      Conjunctiva/sclera: Conjunctivae normal.   Cardiovascular:      Rate and Rhythm: Normal rate and regular rhythm.      Heart sounds: Normal heart sounds.   Pulmonary:      Effort: Pulmonary effort is normal.      Breath sounds: Normal breath sounds.   Abdominal:      Palpations: Abdomen is soft. "      Tenderness: There is no abdominal tenderness. There is no guarding or rebound.          Comments:   Small palpable mass measuring approximately 3 x 3 cm to the periumbilical region of the abdomen superior to the umbilicus with slight tenderness to palpation.  No swelling.  No overlying erythema.  No increased warmth.  No active discharge/drainage.  No surrounding induration.  No palpable fluctuance.   Musculoskeletal:         General: Normal range of motion.      Cervical back: Normal range of motion and neck supple.   Skin:     General: Skin is warm and dry.   Neurological:      Mental Status: She is alert and oriented to person, place, and time.            Progress:  Soft Tissue US of Abdomen:  COMPARISON: None available.     FINDINGS:  Ultrasound in the mid abdomen just to the right midline in the area of palpable lump is performed.     There is a superficial complex hypoechoic mass which may represent complex fluid with some internal peripheral vascularity but no internal vascularity. There is a least one septation with internal debris. This measures 18 x 18 x 8 mm.     IMPRESSION:  1.  There is a superficial fluid collection just deep to the skin within the subcutaneous fat with complex internal echoes and peripheral blood flow. This finding is suspicious for an abscess.            Assessment/Plan:          1. Mass of soft tissue of abdomen  - US-ABDOMEN LTD (SOFT TISSUE); Future    2. Abscess of abdominal wall  - sulfamethoxazole-trimethoprim (BACTRIM DS) 800-160 MG tablet; Take 1 tablet by mouth 2 times a day for 7 days.  Dispense: 14 tablet; Refill: 0  - REFERRAL TO GENERAL SURGERY    The patient's presenting symptoms and physical exam findings are consistent with a mass of the soft tissue of the abdomen.  On physical exam, the patient had a small palpable mass measuring approximately 3 x 3 centimeter to the periumbilical region of the abdomen superior to the umbilicus with slight tenderness palpation.   No swelling, overlying erythema, increased warmth, active discharge/drainage, surrounding induration, or palpable fluctuance was appreciated.  The remainder of the patient's abdomen was soft and nontender.  The patient appears in no acute distress.  The patient's vital signs are stable and within normal limits.  She is afebrile today in clinic.  Will order a soft tissue ultrasound of the abdomen to further evaluate the patient's soft tissue mass.  The patient's soft tissue abdomen showed a superficial fluid collection just deep to the skin within the subcutaneous fat with complex internal echoes and peripheral blood flow suspicious for an abscess.  Reviewed the ultrasound results with the patient via telephone.  Given the patient's abscess is subcutaneous, I&D in clinic is not indicated at this time.  Will place an urgent referral to general surgery for further evaluation and management.  In the meantime, will prescribe the patient Bactrim for the acute infection.  Advised patient to monitor for worsening signs and/or symptoms recommend OTC medications and supportive care for symptomatic management.  Recommend patient follow-up with PCP as needed.  Discussed STRICT ED precautions with the patient, and she verbalized understanding.    Differential diagnoses, supportive care, and indications for immediate follow-up discussed with patient.   Instructed to return to clinic or nearest emergency department for any change in condition, further concerns, or worsening of symptoms.    OTC Tylenol or Motrin for fever/discomfort.  Monitor for worsening signs and/or symptoms  Referral to general surgery  Follow-up PCP as needed  Return to clinic or go to the ED if symptoms worsen or fail to improve, or if the patient develop worsening/increasing/persistent bump to the abdomen, pain/ice affected area, swelling, increased redness or warmth, fever/chills, secondary signs of infection, and/or any concerning symptoms.    Discussed  plan with the patient, and she agrees to the above.     I personally reviewed prior external notes and test results pertinent to today's visit.  I have independently reviewed and interpreted all diagnostics ordered during this urgent care visit.     Time spent evaluating this patient was at least 30 minutes and includes preparing for visit, obtaining history, exam and evaluation, ordering labs/tests/procedures/medications, independent interpretation, and counseling/education.    Please note that this dictation was created using voice recognition software. I have made every reasonable attempt to correct obvious errors, but I expect that there may be errors of grammar and possibly content that I did not discover before finalizing the note.     This note was electronically signed by Amisha Ortega PA-C

## 2021-07-27 ENCOUNTER — PRE-ADMISSION TESTING (OUTPATIENT)
Dept: ADMISSIONS | Facility: MEDICAL CENTER | Age: 31
End: 2021-07-27
Attending: SURGERY
Payer: COMMERCIAL

## 2021-08-06 ENCOUNTER — HOSPITAL ENCOUNTER (OUTPATIENT)
Facility: MEDICAL CENTER | Age: 31
End: 2021-08-06
Attending: SURGERY | Admitting: SURGERY
Payer: COMMERCIAL

## 2021-08-06 ENCOUNTER — ANESTHESIA (OUTPATIENT)
Dept: SURGERY | Facility: MEDICAL CENTER | Age: 31
End: 2021-08-06
Payer: COMMERCIAL

## 2021-08-06 ENCOUNTER — ANESTHESIA EVENT (OUTPATIENT)
Dept: SURGERY | Facility: MEDICAL CENTER | Age: 31
End: 2021-08-06
Payer: COMMERCIAL

## 2021-08-06 VITALS
HEART RATE: 67 BPM | TEMPERATURE: 97.8 F | BODY MASS INDEX: 27.77 KG/M2 | SYSTOLIC BLOOD PRESSURE: 106 MMHG | DIASTOLIC BLOOD PRESSURE: 82 MMHG | HEIGHT: 63 IN | OXYGEN SATURATION: 98 % | WEIGHT: 156.75 LBS | RESPIRATION RATE: 16 BRPM

## 2021-08-06 DIAGNOSIS — G89.18 POST-OPERATIVE PAIN: ICD-10-CM

## 2021-08-06 PROBLEM — L02.211 ABDOMINAL WALL ABSCESS: Chronic | Status: ACTIVE | Noted: 2021-08-06

## 2021-08-06 LAB
ANION GAP SERPL CALC-SCNC: 11 MMOL/L (ref 7–16)
BUN SERPL-MCNC: 9 MG/DL (ref 8–22)
CALCIUM SERPL-MCNC: 9.3 MG/DL (ref 8.5–10.5)
CHLORIDE SERPL-SCNC: 106 MMOL/L (ref 96–112)
CO2 SERPL-SCNC: 22 MMOL/L (ref 20–33)
CREAT SERPL-MCNC: 0.63 MG/DL (ref 0.5–1.4)
EKG IMPRESSION: NORMAL
GLUCOSE BLD-MCNC: 131 MG/DL (ref 65–99)
GLUCOSE SERPL-MCNC: 140 MG/DL (ref 65–99)
HCG UR QL: NEGATIVE
POTASSIUM SERPL-SCNC: 4.2 MMOL/L (ref 3.6–5.5)
SODIUM SERPL-SCNC: 139 MMOL/L (ref 135–145)

## 2021-08-06 PROCEDURE — 160048 HCHG OR STATISTICAL LEVEL 1-5: Performed by: SURGERY

## 2021-08-06 PROCEDURE — 700111 HCHG RX REV CODE 636 W/ 250 OVERRIDE (IP): Performed by: ANESTHESIOLOGY

## 2021-08-06 PROCEDURE — 82962 GLUCOSE BLOOD TEST: CPT | Mod: 91

## 2021-08-06 PROCEDURE — 87075 CULTR BACTERIA EXCEPT BLOOD: CPT

## 2021-08-06 PROCEDURE — 160036 HCHG PACU - EA ADDL 30 MINS PHASE I: Performed by: SURGERY

## 2021-08-06 PROCEDURE — 81025 URINE PREGNANCY TEST: CPT

## 2021-08-06 PROCEDURE — 700101 HCHG RX REV CODE 250: Performed by: ANESTHESIOLOGY

## 2021-08-06 PROCEDURE — 80048 BASIC METABOLIC PNL TOTAL CA: CPT

## 2021-08-06 PROCEDURE — 700102 HCHG RX REV CODE 250 W/ 637 OVERRIDE(OP): Performed by: ANESTHESIOLOGY

## 2021-08-06 PROCEDURE — 87070 CULTURE OTHR SPECIMN AEROBIC: CPT

## 2021-08-06 PROCEDURE — A9270 NON-COVERED ITEM OR SERVICE: HCPCS | Performed by: ANESTHESIOLOGY

## 2021-08-06 PROCEDURE — 700101 HCHG RX REV CODE 250: Performed by: SURGERY

## 2021-08-06 PROCEDURE — 160035 HCHG PACU - 1ST 60 MINS PHASE I: Performed by: SURGERY

## 2021-08-06 PROCEDURE — 501838 HCHG SUTURE GENERAL: Performed by: SURGERY

## 2021-08-06 PROCEDURE — 160009 HCHG ANES TIME/MIN: Performed by: SURGERY

## 2021-08-06 PROCEDURE — 93005 ELECTROCARDIOGRAM TRACING: CPT | Performed by: SURGERY

## 2021-08-06 PROCEDURE — 700105 HCHG RX REV CODE 258: Performed by: SURGERY

## 2021-08-06 PROCEDURE — 160046 HCHG PACU - 1ST 60 MINS PHASE II: Performed by: SURGERY

## 2021-08-06 PROCEDURE — 87205 SMEAR GRAM STAIN: CPT

## 2021-08-06 PROCEDURE — 160025 RECOVERY II MINUTES (STATS): Performed by: SURGERY

## 2021-08-06 PROCEDURE — 93010 ELECTROCARDIOGRAM REPORT: CPT | Performed by: INTERNAL MEDICINE

## 2021-08-06 PROCEDURE — 160002 HCHG RECOVERY MINUTES (STAT): Performed by: SURGERY

## 2021-08-06 PROCEDURE — 160028 HCHG SURGERY MINUTES - 1ST 30 MINS LEVEL 3: Performed by: SURGERY

## 2021-08-06 RX ORDER — SULFAMETHOXAZOLE AND TRIMETHOPRIM 800; 160 MG/1; MG/1
1 TABLET ORAL 2 TIMES DAILY
Qty: 14 TABLET | Refills: 0 | Status: SHIPPED | OUTPATIENT
Start: 2021-08-06 | End: 2021-08-13

## 2021-08-06 RX ORDER — OXYCODONE HCL 5 MG/5 ML
5 SOLUTION, ORAL ORAL
Status: COMPLETED | OUTPATIENT
Start: 2021-08-06 | End: 2021-08-06

## 2021-08-06 RX ORDER — CEFAZOLIN SODIUM 1 G/3ML
INJECTION, POWDER, FOR SOLUTION INTRAMUSCULAR; INTRAVENOUS PRN
Status: DISCONTINUED | OUTPATIENT
Start: 2021-08-06 | End: 2021-08-06 | Stop reason: SURG

## 2021-08-06 RX ORDER — SODIUM CHLORIDE, SODIUM LACTATE, POTASSIUM CHLORIDE, CALCIUM CHLORIDE 600; 310; 30; 20 MG/100ML; MG/100ML; MG/100ML; MG/100ML
INJECTION, SOLUTION INTRAVENOUS CONTINUOUS
Status: DISCONTINUED | OUTPATIENT
Start: 2021-08-06 | End: 2021-08-06 | Stop reason: HOSPADM

## 2021-08-06 RX ORDER — HYDROMORPHONE HYDROCHLORIDE 1 MG/ML
0.1 INJECTION, SOLUTION INTRAMUSCULAR; INTRAVENOUS; SUBCUTANEOUS
Status: DISCONTINUED | OUTPATIENT
Start: 2021-08-06 | End: 2021-08-06 | Stop reason: HOSPADM

## 2021-08-06 RX ORDER — HYDROMORPHONE HYDROCHLORIDE 1 MG/ML
0.4 INJECTION, SOLUTION INTRAMUSCULAR; INTRAVENOUS; SUBCUTANEOUS
Status: DISCONTINUED | OUTPATIENT
Start: 2021-08-06 | End: 2021-08-06 | Stop reason: HOSPADM

## 2021-08-06 RX ORDER — MIDAZOLAM HYDROCHLORIDE 1 MG/ML
1 INJECTION INTRAMUSCULAR; INTRAVENOUS
Status: DISCONTINUED | OUTPATIENT
Start: 2021-08-06 | End: 2021-08-06 | Stop reason: HOSPADM

## 2021-08-06 RX ORDER — ACETAMINOPHEN 500 MG
1000 TABLET ORAL ONCE
Status: COMPLETED | OUTPATIENT
Start: 2021-08-06 | End: 2021-08-06

## 2021-08-06 RX ORDER — INSULIN LISPRO-AABC 100 [IU]/ML
4-12 INJECTION, SOLUTION SUBCUTANEOUS
COMMUNITY

## 2021-08-06 RX ORDER — METOPROLOL TARTRATE 1 MG/ML
1 INJECTION, SOLUTION INTRAVENOUS
Status: DISCONTINUED | OUTPATIENT
Start: 2021-08-06 | End: 2021-08-06 | Stop reason: HOSPADM

## 2021-08-06 RX ORDER — LABETALOL HYDROCHLORIDE 5 MG/ML
5 INJECTION, SOLUTION INTRAVENOUS
Status: DISCONTINUED | OUTPATIENT
Start: 2021-08-06 | End: 2021-08-06 | Stop reason: HOSPADM

## 2021-08-06 RX ORDER — HALOPERIDOL 5 MG/ML
1 INJECTION INTRAMUSCULAR
Status: DISCONTINUED | OUTPATIENT
Start: 2021-08-06 | End: 2021-08-06 | Stop reason: HOSPADM

## 2021-08-06 RX ORDER — BUPIVACAINE HYDROCHLORIDE 5 MG/ML
INJECTION, SOLUTION EPIDURAL; INTRACAUDAL
Status: DISCONTINUED | OUTPATIENT
Start: 2021-08-06 | End: 2021-08-06 | Stop reason: HOSPADM

## 2021-08-06 RX ORDER — HYDROMORPHONE HYDROCHLORIDE 1 MG/ML
0.2 INJECTION, SOLUTION INTRAMUSCULAR; INTRAVENOUS; SUBCUTANEOUS
Status: DISCONTINUED | OUTPATIENT
Start: 2021-08-06 | End: 2021-08-06 | Stop reason: HOSPADM

## 2021-08-06 RX ORDER — DIPHENHYDRAMINE HYDROCHLORIDE 50 MG/ML
12.5 INJECTION INTRAMUSCULAR; INTRAVENOUS
Status: DISCONTINUED | OUTPATIENT
Start: 2021-08-06 | End: 2021-08-06 | Stop reason: HOSPADM

## 2021-08-06 RX ORDER — OXYCODONE HCL 5 MG/5 ML
10 SOLUTION, ORAL ORAL
Status: COMPLETED | OUTPATIENT
Start: 2021-08-06 | End: 2021-08-06

## 2021-08-06 RX ORDER — ONDANSETRON 2 MG/ML
4 INJECTION INTRAMUSCULAR; INTRAVENOUS
Status: COMPLETED | OUTPATIENT
Start: 2021-08-06 | End: 2021-08-06

## 2021-08-06 RX ORDER — LIDOCAINE HYDROCHLORIDE 20 MG/ML
INJECTION, SOLUTION EPIDURAL; INFILTRATION; INTRACAUDAL; PERINEURAL PRN
Status: DISCONTINUED | OUTPATIENT
Start: 2021-08-06 | End: 2021-08-06 | Stop reason: SURG

## 2021-08-06 RX ORDER — SODIUM CHLORIDE, SODIUM LACTATE, POTASSIUM CHLORIDE, CALCIUM CHLORIDE 600; 310; 30; 20 MG/100ML; MG/100ML; MG/100ML; MG/100ML
INJECTION, SOLUTION INTRAVENOUS CONTINUOUS
Status: ACTIVE | OUTPATIENT
Start: 2021-08-06 | End: 2021-08-06

## 2021-08-06 RX ORDER — OXYCODONE HYDROCHLORIDE AND ACETAMINOPHEN 5; 325 MG/1; MG/1
1 TABLET ORAL EVERY 6 HOURS PRN
Qty: 12 TABLET | Refills: 0 | Status: SHIPPED | OUTPATIENT
Start: 2021-08-06 | End: 2021-08-09

## 2021-08-06 RX ORDER — IPRATROPIUM BROMIDE AND ALBUTEROL SULFATE 2.5; .5 MG/3ML; MG/3ML
3 SOLUTION RESPIRATORY (INHALATION)
Status: DISCONTINUED | OUTPATIENT
Start: 2021-08-06 | End: 2021-08-06 | Stop reason: HOSPADM

## 2021-08-06 RX ORDER — HYDRALAZINE HYDROCHLORIDE 20 MG/ML
5 INJECTION INTRAMUSCULAR; INTRAVENOUS
Status: DISCONTINUED | OUTPATIENT
Start: 2021-08-06 | End: 2021-08-06 | Stop reason: HOSPADM

## 2021-08-06 RX ORDER — ONDANSETRON 2 MG/ML
INJECTION INTRAMUSCULAR; INTRAVENOUS PRN
Status: DISCONTINUED | OUTPATIENT
Start: 2021-08-06 | End: 2021-08-06 | Stop reason: HOSPADM

## 2021-08-06 RX ORDER — MEPERIDINE HYDROCHLORIDE 25 MG/ML
12.5 INJECTION INTRAMUSCULAR; INTRAVENOUS; SUBCUTANEOUS
Status: DISCONTINUED | OUTPATIENT
Start: 2021-08-06 | End: 2021-08-06 | Stop reason: HOSPADM

## 2021-08-06 RX ADMIN — LIDOCAINE HYDROCHLORIDE 100 MG: 20 INJECTION, SOLUTION EPIDURAL; INFILTRATION; INTRACAUDAL at 15:06

## 2021-08-06 RX ADMIN — ONDANSETRON 4 MG: 2 INJECTION INTRAMUSCULAR; INTRAVENOUS at 15:59

## 2021-08-06 RX ADMIN — CEFAZOLIN 2 G: 330 INJECTION, POWDER, FOR SOLUTION INTRAMUSCULAR; INTRAVENOUS at 15:08

## 2021-08-06 RX ADMIN — OXYCODONE HYDROCHLORIDE 10 MG: 5 SOLUTION ORAL at 15:59

## 2021-08-06 RX ADMIN — SODIUM CHLORIDE, POTASSIUM CHLORIDE, SODIUM LACTATE AND CALCIUM CHLORIDE: 600; 310; 30; 20 INJECTION, SOLUTION INTRAVENOUS at 15:03

## 2021-08-06 RX ADMIN — ONDANSETRON 4 MG: 2 INJECTION INTRAMUSCULAR; INTRAVENOUS at 15:26

## 2021-08-06 RX ADMIN — FENTANYL CITRATE 100 MCG: 50 INJECTION, SOLUTION INTRAMUSCULAR; INTRAVENOUS at 15:08

## 2021-08-06 RX ADMIN — ACETAMINOPHEN 1000 MG: 500 TABLET ORAL at 15:59

## 2021-08-06 RX ADMIN — PROPOFOL 200 MG: 10 INJECTION, EMULSION INTRAVENOUS at 15:06

## 2021-08-06 ASSESSMENT — PAIN DESCRIPTION - PAIN TYPE
TYPE: SURGICAL PAIN

## 2021-08-06 ASSESSMENT — FIBROSIS 4 INDEX: FIB4 SCORE: 0.5

## 2021-08-06 NOTE — DISCHARGE INSTR - OTHER INFO
Discharge Instructions:    Care of Your Incisions:   Leave the bandages on for 48 hours. You can shower with the dressing on as it is waterproof.  Avoid getting lotions, powders or deodorant on the incision while it is healing.   You may have thin paper strips across the incision. These are called Steri-Strips. When they start to curl at the edges, you can peel them off. It is okay to shower with these on.   Don’t worry if the area under either incision feels firm or hard. This is normal and usually softens within a few months.    How to Manage Discomfort After Surgery:   It is normal to have tenderness, discomfort or mild swelling at the site of the incisions.     You may also feel:  - Numbness at the incisions.     You will be given a prescription for pain medication prior to being discharged from the hospital. If you are having pain, take the medication as directed by your physician and by the label directions. You should be comfortable and moving around. Most people use some prescription pain medication, though some need only over the counter pain medication, such as ibuprofen (Motrin) or acetaminophen (Tylenol). Some women have little pain and take nothing at all. Whatever amount of pain you have, it is important to listen to your body and use the medication if needed during your recovery.     Prescription pain medication may cause constipation. If you are having problems, use what you normally would or call your nurse for suggestions. It also helps to stay regular by including fiber in your diet (for example: bran or fruits and vegetables) and drink plenty of liquids (water, juice, etc.).    Activity:   You may resume your normal routine, but avoid heavy lifting (over 10 pounds), pushing or pulling until your first post-operative visit, unless otherwise specified by your surgeon.   Do not drive for at least 24-48 hours after surgery (unless otherwise directed by your surgeon), or while you are taking  prescription pain medicine. Prescription pain medicine causes drowsiness (makes you sleepy) so you should avoid doing any tasks where you should be awake and alert (driving, operating machinery, etc).    When to Call Your Doctor/Nurse:   If you have a fever greater than 100.5, increased pain, redness or warmth at the area around the incision, drainage from the incision or around the drain. Call Dr. Robertson's office at [phone] with any other questions or concerns.    You should have an appointment to see Dr. Robertson about a week after surgery, call [phone] if you do not already have an appointment.    Office address:  [facility]  [phone]

## 2021-08-06 NOTE — ANESTHESIA TIME REPORT
Anesthesia Start and Stop Event Times     Date Time Event    8/6/2021 1501 Ready for Procedure     1503 Anesthesia Start     1535 Anesthesia Stop        Responsible Staff  08/06/21    Name Role Begin End    Rashel Hartman M.D. Anesth 1503 1535        Preop Diagnosis (Free Text):  Pre-op Diagnosis     SUBCUTANEOUS MASS        Preop Diagnosis (Codes):    Post op Diagnosis  Subcutaneous mass of abdominal wall      Premium Reason  A. 3PM - 7AM    Comments:

## 2021-08-06 NOTE — ANESTHESIA POSTPROCEDURE EVALUATION
Patient: Rachel Esparza    Procedure Summary     Date: 08/06/21 Room / Location: [place] [address] / SURGERY [facility]    Anesthesia Start: 1503 Anesthesia Stop: 1535    Procedures:       EXCISION, MASS - BIOPSY, SUBCUTANEOUS MASS SUPERIOR TO UMBILICUS, VERSUS (N/A Abdomen)      INCISION AND DRAINAGE. (N/A Abdomen) Diagnosis: (SUBCUTANEOUS MASS)    Surgeons: Rizwan Tay M.D. Responsible Provider: Rashel Hartman M.D.    Anesthesia Type: general ASA Status: 2          Final Anesthesia Type: general  Last vitals  BP   Blood Pressure: 118/93    Temp   36 °C (96.8 °F)    Pulse   70   Resp   18    SpO2   100 %      Anesthesia Post Evaluation    Patient location during evaluation: PACU  Patient participation: complete - patient participated  Level of consciousness: awake and alert    Airway patency: patent  Anesthetic complications: no  Cardiovascular status: hemodynamically stable  Respiratory status: acceptable  Hydration status: euvolemic    PONV: none          No complications documented.

## 2021-08-06 NOTE — OP REPORT
DATE OF OPERATION: 8/6/2021    PREOPERATIVE DIAGNOSIS: Abdominal wall mass    POSTOPERATIVE DIAGNOSIS: Abdominal abscess    PROCEDURE PERFORMED:  1.  Incision and drainage of an abdominal wall abscess    SURGEON: Rizwan Tay M.D.    ASSISTANT: None    ANESTHESIOLOGIST: Rashel Hartman M.D.    ANESTHESIA:  General endotracheal anesthesia.    ASA CLASSIFICATION: II.    INDICATION:  The patient is a 31 y.o. female with abdominal wall mass. She is taken to the operating room for excisional biopsy.    FINDINGS: An infected cyst was identified superior to the umbilicus measuring 3 cm in its greatest dimension involving the skin and subcutaneous fat.  It was drained and irrigated.  It was at the wound was packed.    WOUND CLASSIFICATION: Class IV, Dirty, Infected.    SPECIMEN: Abscess fluid for Gram culture with sensitivities.    ESTIMATED BLOOD LOSS:  5 mL.    PROCEDURE:    After informed witnessed consent was obtained, the patient was taken to the operating room and underwent general endotracheal anesthesia without incident.  Preoperative antibiotics were administered.  Bilateral lower sequential compression devices were placed.  The abdomen was prepped and draped in the usual standard sterile fashion.  A timeout was performed verifying the correct patient, procedure, site, positioning in availability of equipment prior to starting surgery.  I began with an elliptical incision trying to identify the cystic cavity however a large amount of purulence was expressed and I elected to drain the abscess and found no evidence of a organized cystic formation.  Wound was packed with quarter inch iodoform dressing and a sponge and Tegaderm were applied as a dressing.  All sponge and instrument counts were correct x2.  Rizwan Tay MD PhD  [facility]  Colon and Rectal Surgeon  [phone]

## 2021-08-06 NOTE — ANESTHESIA PROCEDURE NOTES
Airway    Date/Time: 8/6/2021 3:07 PM  Performed by: Rashel Hartman M.D.  Authorized by: Rashel Hartman M.D.     Location:  OR  Urgency:  Elective  Difficult Airway: No    Indications for Airway Management:  Anesthesia      Spontaneous Ventilation: absent    Sedation Level:  Deep  Preoxygenated: Yes    Mask Difficulty Assessment:  1 - vent by mask  Final Airway Type:  Supraglottic airway  Final Supraglottic Airway:  Standard LMA    SGA Size:  3  Number of Attempts at Approach:  1

## 2021-08-06 NOTE — ANESTHESIA PREPROCEDURE EVALUATION
Relevant Problems   ENDO   (positive) Type 1 diabetes mellitus without complication (HCC)      Other   (positive) Hemochromatosis   (positive) Prepatellar bursitis       Physical Exam    Airway   Mallampati: II  TM distance: >3 FB  Neck ROM: full       Cardiovascular - normal exam  Rhythm: regular  Rate: normal  (-) murmur     Dental - normal exam           Pulmonary - normal exam  Breath sounds clear to auscultation     Abdominal    Neurological - normal exam                 Anesthesia Plan    ASA 2       Plan - general       Airway plan will be LMA          Induction: intravenous    Postoperative Plan: Postoperative administration of opioids is intended.    Pertinent diagnostic labs and testing reviewed    Informed Consent:    Anesthetic plan and risks discussed with patient.    Use of blood products discussed with: patient whom consented to blood products.

## 2021-08-06 NOTE — DISCHARGE INSTRUCTIONS
ACTIVITY: Rest and take it easy for the first 24 hours.  A responsible adult is recommended to remain with you during that time.  It is normal to feel sleepy.  We encourage you to not do anything that requires balance, judgment or coordination.    MILD FLU-LIKE SYMPTOMS ARE NORMAL. YOU MAY EXPERIENCE GENERALIZED MUSCLE ACHES, THROAT IRRITATION, HEADACHE AND/OR SOME NAUSEA.    FOR 24 HOURS DO NOT:  Drive, operate machinery or run household appliances.  Drink beer or alcoholic beverages.   Make important decisions or sign legal documents.    SPECIAL INSTRUCTIONS: Follow any additional instructions given to you by the doctor.      DIET: To avoid nausea, slowly advance diet as tolerated, avoiding spicy or greasy foods for the first day.  Add more substantial food to your diet according to your physician's instructions.  Babies can be fed formula or breast milk as soon as they are hungry.  INCREASE FLUIDS AND FIBER TO AVOID CONSTIPATION.    SURGICAL DRESSING/BATHING: Keep dressing and on and clean and dry until your follow up appointment.      FOLLOW-UP APPOINTMENT:  A follow-up appointment should be arranged with your doctor; call to schedule.    You should CALL YOUR PHYSICIAN if you develop:  Fever greater than 101 degrees F.  Pain not relieved by medication, or persistent nausea or vomiting.  Excessive bleeding (blood soaking through dressing) or unexpected drainage from the wound.  Extreme redness or swelling around the incision site, drainage of pus or foul smelling drainage.  Inability to urinate or empty your bladder within 8 hours.  Problems with breathing or chest pain.    You should call 911 if you develop problems with breathing or chest pain.  If you are unable to contact your doctor or surgical center, you should go to the nearest emergency room or urgent care center.  Physician's telephone #: Dr Tay [phone]    If any questions arise, call your doctor.  If your doctor is not available, please feel  free to call the Surgical Center at [phone]. The Contact Center is open Monday through Friday 7AM to 5PM and may speak to a nurse at [phone], or toll free at [phone].     A registered nurse may call you a few days after your surgery to see how you are doing after your procedure.    MEDICATIONS: Resume taking daily medication.  Take prescribed pain medication with food.  If no medication is prescribed, you may take non-aspirin pain medication if needed.  PAIN MEDICATION CAN BE VERY CONSTIPATING.  Take a stool softener or laxative such as senokot, pericolace, or milk of magnesia if needed.    Prescription given for bactrim (antibiotic), percocet (pain).  Last pain medication given at 4pm (10mg oxycodone).    If your physician has prescribed pain medication that includes Acetaminophen (Tylenol), do not take additional Acetaminophen (Tylenol) while taking the prescribed medication.    Depression / Suicide Risk    As you are discharged from this [facility] facility, it is important to learn how to keep safe from harming yourself.    Recognize the warning signs:  · Abrupt changes in personality, positive or negative- including increase in energy   · Giving away possessions  · Change in eating patterns- significant weight changes-  positive or negative  · Change in sleeping patterns- unable to sleep or sleeping all the time   · Unwillingness or inability to communicate  · Depression  · Unusual sadness, discouragement and loneliness  · Talk of wanting to die  · Neglect of personal appearance   · Rebelliousness- reckless behavior  · Withdrawal from people/activities they love  · Confusion- inability to concentrate     If you or a loved one observes any of these behaviors or has concerns about self-harm, here's what you can do:  · Talk about it- your feelings and reasons for harming yourself  · Remove any means that you might use to hurt yourself (examples: pills, rope, extension cords, firearm)  · Get  professional help from the community (Mental Health, Substance Abuse, psychological counseling)  · Do not be alone:Call your Safe Contact- someone whom you trust who will be there for you.  · Call your local CRISIS HOTLINE 172-4158 or 303-577-1523  · Call your local Children's Mobile Crisis Response Team [place] (775) 165-1254 or www.Nexamp  · Call the toll free National Suicide Prevention Hotlines   · National Suicide Prevention Lifeline 784-826-SLOZ (7037)  · National Hope Line Network 800-SUICIDE (082-1047)

## 2021-08-06 NOTE — OR NURSING
1531 Pt arrived from OR via gurney. Report given by anesthesia and RN. Awake, calm, 6L mask, even non labored breathing, lungs clear, airway patent. SR. Skin pink warm dry. abd dressing cdi, soft non distended. Denies pain, nausea, sob, chest pain.     1540       1552 updated Venus, sister in law. O2 tank at 50% full for transfer.     1559 c/o pain and nausea, treated per mar. abd dressing cdi, scant sanguinous drainage. abd soft non distended.     1615 pain tolerable. Nausea resolved.     1630 awake and alert. Even non labored breathing. Skin pink warm dry. abd dressing cdi, no new drainage.     1645 no changes. Report given to Brendan BRITT. Ready for transfer to phase 2. VSS, awake and alert.

## 2021-08-07 LAB
GRAM STN SPEC: NORMAL
SIGNIFICANT IND 70042: NORMAL
SITE SITE: NORMAL
SOURCE SOURCE: NORMAL

## 2021-08-07 NOTE — OR NURSING
Pt's VSS; denies N/V; states pain is at tolerable level. Dressing CDI to abdomen. D/c orders received. IV dc'd. Pt changed into clothing with assistance. Discharge instructions given as well as pain management handout; pt and family verbalized understanding and questions answered. Patient states ready to d/c home. No prescriptions given, sent electronically to pt pharmacy. Pt dc'd in w/c with RN in stable condition.

## 2021-08-09 LAB
BACTERIA WND AEROBE CULT: NORMAL
GLUCOSE BLD-MCNC: 143 MG/DL (ref 65–99)
GRAM STN SPEC: NORMAL
SIGNIFICANT IND 70042: NORMAL
SITE SITE: NORMAL
SOURCE SOURCE: NORMAL

## 2021-08-11 LAB
BACTERIA SPEC ANAEROBE CULT: NORMAL
SIGNIFICANT IND 70042: NORMAL
SITE SITE: NORMAL
SOURCE SOURCE: NORMAL

## 2022-06-07 ENCOUNTER — HOSPITAL ENCOUNTER (OUTPATIENT)
Dept: LAB | Facility: MEDICAL CENTER | Age: 32
End: 2022-06-07
Attending: INTERNAL MEDICINE
Payer: COMMERCIAL

## 2022-06-07 LAB
BASOPHILS # BLD AUTO: 0.7 % (ref 0–1.8)
BASOPHILS # BLD: 0.04 K/UL (ref 0–0.12)
EOSINOPHIL # BLD AUTO: 0.11 K/UL (ref 0–0.51)
EOSINOPHIL NFR BLD: 1.9 % (ref 0–6.9)
ERYTHROCYTE [DISTWIDTH] IN BLOOD BY AUTOMATED COUNT: 47.6 FL (ref 35.9–50)
FERRITIN SERPL-MCNC: 53.5 NG/ML (ref 10–291)
HCT VFR BLD AUTO: 44 % (ref 37–47)
HGB BLD-MCNC: 14.6 G/DL (ref 12–16)
IMM GRANULOCYTES # BLD AUTO: 0.08 K/UL (ref 0–0.11)
IMM GRANULOCYTES NFR BLD AUTO: 1.4 % (ref 0–0.9)
IRON SATN MFR SERPL: 32 % (ref 15–55)
IRON SERPL-MCNC: 94 UG/DL (ref 40–170)
LYMPHOCYTES # BLD AUTO: 1.15 K/UL (ref 1–4.8)
LYMPHOCYTES NFR BLD: 20.3 % (ref 22–41)
MCH RBC QN AUTO: 33.4 PG (ref 27–33)
MCHC RBC AUTO-ENTMCNC: 33.2 G/DL (ref 33.6–35)
MCV RBC AUTO: 100.7 FL (ref 81.4–97.8)
MONOCYTES # BLD AUTO: 0.36 K/UL (ref 0–0.85)
MONOCYTES NFR BLD AUTO: 6.3 % (ref 0–13.4)
NEUTROPHILS # BLD AUTO: 3.93 K/UL (ref 2–7.15)
NEUTROPHILS NFR BLD: 69.4 % (ref 44–72)
NRBC # BLD AUTO: 0 K/UL
NRBC BLD-RTO: 0 /100 WBC
PLATELET # BLD AUTO: 344 K/UL (ref 164–446)
PMV BLD AUTO: 9.7 FL (ref 9–12.9)
RBC # BLD AUTO: 4.37 M/UL (ref 4.2–5.4)
TIBC SERPL-MCNC: 294 UG/DL (ref 250–450)
UIBC SERPL-MCNC: 200 UG/DL (ref 110–370)
WBC # BLD AUTO: 5.7 K/UL (ref 4.8–10.8)

## 2022-06-07 PROCEDURE — 85025 COMPLETE CBC W/AUTO DIFF WBC: CPT

## 2022-06-07 PROCEDURE — 84156 ASSAY OF PROTEIN URINE: CPT

## 2022-06-07 PROCEDURE — 83550 IRON BINDING TEST: CPT

## 2022-06-07 PROCEDURE — 36415 COLL VENOUS BLD VENIPUNCTURE: CPT

## 2022-06-07 PROCEDURE — 86335 IMMUNFIX E-PHORSIS/URINE/CSF: CPT

## 2022-06-07 PROCEDURE — 82728 ASSAY OF FERRITIN: CPT

## 2022-06-07 PROCEDURE — 84155 ASSAY OF PROTEIN SERUM: CPT

## 2022-06-07 PROCEDURE — 84165 PROTEIN E-PHORESIS SERUM: CPT

## 2022-06-07 PROCEDURE — 83540 ASSAY OF IRON: CPT

## 2022-06-07 PROCEDURE — 84166 PROTEIN E-PHORESIS/URINE/CSF: CPT

## 2022-06-10 LAB
ALBUMIN SERPL ELPH-MCNC: 4.15 G/DL (ref 3.75–5.01)
ALPHA1 GLOB SERPL ELPH-MCNC: 0.31 G/DL (ref 0.19–0.46)
ALPHA2 GLOB SERPL ELPH-MCNC: 0.7 G/DL (ref 0.48–1.05)
B-GLOBULIN SERPL ELPH-MCNC: 0.86 G/DL (ref 0.48–1.1)
EER PROT ELECT SER Q1092: NORMAL
GAMMA GLOB SERPL ELPH-MCNC: 0.78 G/DL (ref 0.62–1.51)
INTERPRETATION SERPL IFE-IMP: NORMAL
PROT SERPL-MCNC: 6.8 G/DL (ref 6.3–8.2)

## 2022-06-15 LAB
ALBUMIN 24H MFR UR ELPH: 46.2 %
ALPHA1 GLOB 24H MFR UR ELPH: 3.4 %
ALPHA2 GLOB 24H MFR UR ELPH: 26.2 %
B-GLOBULIN 24H MFR UR ELPH: 24.2 %
COLLECT DURATION TIME SPEC: NORMAL HRS
EER MONOCLONAL PROTEIN STUDY, 24 HOUR U Q5964: NORMAL
GAMMA GLOB 24H MFR UR ELPH: 0 %
INTERPRETATION UR IFE-IMP: NORMAL
M PROTEIN 24H MFR UR ELPH: 0 %
M PROTEIN 24H UR ELPH-MRATE: NORMAL MG/24 HRS
PROT 24H UR-MRATE: NORMAL MG/D (ref 40–150)
PROT UR-MCNC: 7 MG/DL
SPECIMEN VOL ?TM UR: NORMAL ML

## 2023-01-13 ENCOUNTER — HOSPITAL ENCOUNTER (OUTPATIENT)
Dept: LAB | Facility: MEDICAL CENTER | Age: 33
End: 2023-01-13
Attending: PHYSICIAN ASSISTANT
Payer: COMMERCIAL

## 2023-01-13 LAB
25(OH)D3 SERPL-MCNC: 19 NG/ML (ref 30–100)
ALBUMIN SERPL BCP-MCNC: 4.6 G/DL (ref 3.2–4.9)
ALBUMIN/GLOB SERPL: 2.1 G/DL
ALP SERPL-CCNC: 86 U/L (ref 30–99)
ALT SERPL-CCNC: 16 U/L (ref 2–50)
ANION GAP SERPL CALC-SCNC: 10 MMOL/L (ref 7–16)
AST SERPL-CCNC: 12 U/L (ref 12–45)
BILIRUB SERPL-MCNC: 0.4 MG/DL (ref 0.1–1.5)
BUN SERPL-MCNC: 8 MG/DL (ref 8–22)
CALCIUM ALBUM COR SERPL-MCNC: 8.7 MG/DL (ref 8.5–10.5)
CALCIUM SERPL-MCNC: 9.2 MG/DL (ref 8.5–10.5)
CHLORIDE SERPL-SCNC: 104 MMOL/L (ref 96–112)
CHOLEST SERPL-MCNC: 206 MG/DL (ref 100–199)
CO2 SERPL-SCNC: 25 MMOL/L (ref 20–33)
CREAT SERPL-MCNC: 0.77 MG/DL (ref 0.5–1.4)
CREAT UR-MCNC: 51.07 MG/DL
EST. AVERAGE GLUCOSE BLD GHB EST-MCNC: 169 MG/DL
GFR SERPLBLD CREATININE-BSD FMLA CKD-EPI: 104 ML/MIN/1.73 M 2
GLOBULIN SER CALC-MCNC: 2.2 G/DL (ref 1.9–3.5)
GLUCOSE SERPL-MCNC: 68 MG/DL (ref 65–99)
HBA1C MFR BLD: 7.5 % (ref 4–5.6)
HDLC SERPL-MCNC: 141 MG/DL
LDLC SERPL CALC-MCNC: 57 MG/DL
MICROALBUMIN UR-MCNC: <1.2 MG/DL
MICROALBUMIN/CREAT UR: NORMAL MG/G (ref 0–30)
POTASSIUM SERPL-SCNC: 4.3 MMOL/L (ref 3.6–5.5)
PROT SERPL-MCNC: 6.8 G/DL (ref 6–8.2)
SODIUM SERPL-SCNC: 139 MMOL/L (ref 135–145)
T4 FREE SERPL-MCNC: 0.93 NG/DL (ref 0.93–1.7)
TRIGL SERPL-MCNC: 39 MG/DL (ref 0–149)
TSH SERPL DL<=0.005 MIU/L-ACNC: 0.87 UIU/ML (ref 0.38–5.33)

## 2023-01-13 PROCEDURE — 84443 ASSAY THYROID STIM HORMONE: CPT

## 2023-01-13 PROCEDURE — 80061 LIPID PANEL: CPT

## 2023-01-13 PROCEDURE — 82043 UR ALBUMIN QUANTITATIVE: CPT

## 2023-01-13 PROCEDURE — 80053 COMPREHEN METABOLIC PANEL: CPT

## 2023-01-13 PROCEDURE — 82306 VITAMIN D 25 HYDROXY: CPT

## 2023-01-13 PROCEDURE — 83036 HEMOGLOBIN GLYCOSYLATED A1C: CPT

## 2023-01-13 PROCEDURE — 82570 ASSAY OF URINE CREATININE: CPT

## 2023-01-13 PROCEDURE — 36415 COLL VENOUS BLD VENIPUNCTURE: CPT

## 2023-01-13 PROCEDURE — 84439 ASSAY OF FREE THYROXINE: CPT

## 2023-02-19 ENCOUNTER — OFFICE VISIT (OUTPATIENT)
Dept: URGENT CARE | Facility: CLINIC | Age: 33
End: 2023-02-19
Payer: COMMERCIAL

## 2023-02-19 VITALS
HEART RATE: 78 BPM | RESPIRATION RATE: 14 BRPM | WEIGHT: 151 LBS | DIASTOLIC BLOOD PRESSURE: 80 MMHG | TEMPERATURE: 97.6 F | SYSTOLIC BLOOD PRESSURE: 108 MMHG | BODY MASS INDEX: 26.75 KG/M2 | OXYGEN SATURATION: 97 % | HEIGHT: 63 IN

## 2023-02-19 DIAGNOSIS — J02.9 PHARYNGITIS, UNSPECIFIED ETIOLOGY: ICD-10-CM

## 2023-02-19 DIAGNOSIS — H10.9 BACTERIAL CONJUNCTIVITIS OF LEFT EYE: ICD-10-CM

## 2023-02-19 LAB — S PYO DNA SPEC NAA+PROBE: NOT DETECTED

## 2023-02-19 PROCEDURE — 99213 OFFICE O/P EST LOW 20 MIN: CPT | Performed by: NURSE PRACTITIONER

## 2023-02-19 PROCEDURE — 87651 STREP A DNA AMP PROBE: CPT | Performed by: NURSE PRACTITIONER

## 2023-02-19 RX ORDER — DEXAMETHASONE SODIUM PHOSPHATE 10 MG/ML
10 INJECTION INTRAMUSCULAR; INTRAVENOUS ONCE
Status: COMPLETED | OUTPATIENT
Start: 2023-02-19 | End: 2023-02-19

## 2023-02-19 RX ORDER — OFLOXACIN 3 MG/ML
1 SOLUTION/ DROPS OPHTHALMIC 4 TIMES DAILY
Qty: 10 ML | Refills: 0 | Status: SHIPPED | OUTPATIENT
Start: 2023-02-19 | End: 2023-02-26

## 2023-02-19 RX ADMIN — DEXAMETHASONE SODIUM PHOSPHATE 10 MG: 10 INJECTION INTRAMUSCULAR; INTRAVENOUS at 10:39

## 2023-02-19 ASSESSMENT — ENCOUNTER SYMPTOMS
CHILLS: 0
SORE THROAT: 1
COUGH: 1
SWOLLEN GLANDS: 1
TROUBLE SWALLOWING: 1
DIARRHEA: 0
VOMITING: 0
HEADACHES: 0
FEVER: 0

## 2023-02-19 ASSESSMENT — FIBROSIS 4 INDEX: FIB4 SCORE: 0.29

## 2023-02-19 NOTE — PROGRESS NOTES
Subjective:     Rachel Esparza is a 32 y.o. female who presents for Sore Throat (X 5 days with tickling in throat, L goopy red eye.   She had nasal congestion in the beginning. )      Pharyngitis   This is a new problem. The current episode started in the past 7 days (Rachel is a pleasant 32 year old female who presents to  today with complaints of a severe sore throat X 5 days, red eys with drainage and congestion). The problem has been gradually worsening. The pain is worse on the left side. There has been no fever. Associated symptoms include congestion, coughing, swollen glands and trouble swallowing. Pertinent negatives include no diarrhea, headaches or vomiting. Treatments tried: Dayquil.       Review of Systems   Constitutional:  Positive for malaise/fatigue. Negative for chills and fever.   HENT:  Positive for congestion, sore throat and trouble swallowing.    Respiratory:  Positive for cough.    Gastrointestinal:  Negative for diarrhea and vomiting.   Neurological:  Negative for headaches.     PMH:   Past Medical History:   Diagnosis Date    Diabetes (HCC)     Hyperlipidemia      ALLERGIES: No Known Allergies  SURGHX:   Past Surgical History:   Procedure Laterality Date    MASS EXCISION GENERAL N/A 8/6/2021    Procedure: ABDOMINAL WALL ABSCESS INCISION AND DRAINAGE;  Surgeon: Rizwan Tay M.D.;  Location: SURGERY [facility];  Service: Gastroenterology     SOCHX:   Social History     Socioeconomic History    Marital status:    Tobacco Use    Smoking status: Never    Smokeless tobacco: Never   Vaping Use    Vaping Use: Never used   Substance and Sexual Activity    Alcohol use: Yes     Alcohol/week: 3.0 oz     Types: 5 Shots of liquor per week     Comment: 1-2 drink daily    Drug use: Yes     Types: Marijuana, Inhaled     Comment: marijuana    Sexual activity: Yes     Partners: Male     Birth control/protection: Condom     FH:   Family History   Problem Relation Age of Onset     "Hypertension Father     Diabetes Maternal Uncle     Diabetes Paternal Aunt     Diabetes Paternal Uncle     Arthritis Maternal Grandmother         Rheumatoid arthritis         Objective:   /80   Pulse 78   Temp 36.4 °C (97.6 °F) (Temporal)   Resp 14   Ht 1.6 m (5' 3\")   Wt 68.5 kg (151 lb)   LMP 02/06/2023   SpO2 97%   BMI 26.75 kg/m²     Physical Exam  Vitals and nursing note reviewed.   Constitutional:       General: She is not in acute distress.     Appearance: Normal appearance. She is ill-appearing.   HENT:      Head: Normocephalic and atraumatic.      Right Ear: Tympanic membrane, ear canal and external ear normal. There is no impacted cerumen.      Left Ear: Tympanic membrane, ear canal and external ear normal. There is no impacted cerumen.      Nose: Congestion present. No rhinorrhea.      Mouth/Throat:      Mouth: Mucous membranes are moist.      Pharynx: Posterior oropharyngeal erythema present. No oropharyngeal exudate.   Eyes:      Extraocular Movements: Extraocular movements intact.      Pupils: Pupils are equal, round, and reactive to light.   Cardiovascular:      Rate and Rhythm: Normal rate and regular rhythm.      Pulses: Normal pulses.      Heart sounds: Normal heart sounds.   Pulmonary:      Effort: Pulmonary effort is normal. No respiratory distress.      Breath sounds: Normal breath sounds. No stridor. No wheezing, rhonchi or rales.   Chest:      Chest wall: No tenderness.   Abdominal:      General: Abdomen is flat. Bowel sounds are normal.      Palpations: Abdomen is soft.      Tenderness: There is no abdominal tenderness. There is no right CVA tenderness or left CVA tenderness.   Musculoskeletal:         General: Normal range of motion.      Cervical back: Normal range of motion and neck supple. Tenderness present.   Lymphadenopathy:      Cervical: Cervical adenopathy present.   Skin:     General: Skin is warm and dry.      Capillary Refill: Capillary refill takes less than 2 " seconds.   Neurological:      General: No focal deficit present.      Mental Status: She is alert and oriented to person, place, and time. Mental status is at baseline.   Psychiatric:         Mood and Affect: Mood normal.         Behavior: Behavior normal.         Thought Content: Thought content normal.         Judgment: Judgment normal.     Results for orders placed or performed in visit on 02/19/23   POCT CEPHEID GROUP A STREP - PCR   Result Value Ref Range    POC Group A Strep, PCR Not Detected Not Detected, Invalid       Assessment/Plan:   Assessment    1. Pharyngitis, unspecified etiology  POCT CEPHEID GROUP A STREP - PCR    dexamethasone (DECADRON) injection (check route below) 10 mg      2. Bacterial conjunctivitis of left eye  ofloxacin (OCUFLOX) 0.3 % Solution        We discussed supportive measures including humidifier, warm salt water gargles, over-the-counter Cepacol throat lozenges, rest  and increased fluids. Pt was encouraged to seek treatment back in the ER or urgent care for worsening symptoms,  fever greater than 100.5, wheezes or shortness of breath.    AVS handout given and reviewed with patient. Pt educated on red flags and when to seek treatment back in ER or UC.

## 2023-02-23 ENCOUNTER — HOSPITAL ENCOUNTER (EMERGENCY)
Facility: MEDICAL CENTER | Age: 33
End: 2023-02-23
Attending: EMERGENCY MEDICINE
Payer: COMMERCIAL

## 2023-02-23 VITALS
DIASTOLIC BLOOD PRESSURE: 85 MMHG | WEIGHT: 154.1 LBS | TEMPERATURE: 97.8 F | BODY MASS INDEX: 27.3 KG/M2 | SYSTOLIC BLOOD PRESSURE: 120 MMHG | HEART RATE: 84 BPM | RESPIRATION RATE: 15 BRPM | HEIGHT: 63 IN | OXYGEN SATURATION: 96 %

## 2023-02-23 DIAGNOSIS — J02.9 VIRAL PHARYNGITIS: ICD-10-CM

## 2023-02-23 PROCEDURE — 700111 HCHG RX REV CODE 636 W/ 250 OVERRIDE (IP): Performed by: EMERGENCY MEDICINE

## 2023-02-23 PROCEDURE — 99283 EMERGENCY DEPT VISIT LOW MDM: CPT

## 2023-02-23 RX ORDER — PREDNISONE 20 MG/1
60 TABLET ORAL DAILY
Status: COMPLETED | OUTPATIENT
Start: 2023-02-23 | End: 2023-02-23

## 2023-02-23 RX ORDER — PREDNISONE 20 MG/1
60 TABLET ORAL DAILY
Qty: 6 TABLET | Refills: 0 | Status: SHIPPED | OUTPATIENT
Start: 2023-02-23 | End: 2023-02-25

## 2023-02-23 RX ADMIN — PREDNISONE 60 MG: 20 TABLET ORAL at 03:28

## 2023-02-23 ASSESSMENT — FIBROSIS 4 INDEX: FIB4 SCORE: 0.29

## 2023-02-23 NOTE — ED NOTES
Pt ambulated to room with steady gait. Placed on monitor.  Provided blankets for comfort. Up for ERP to see.

## 2023-02-23 NOTE — ED NOTES
Discharge instructions reviewed with patient. Patient verbalized understanding. Patient instructed to follow discharge instructions and if symptoms worsen to return to ED. Patient ambulated without assistance out of ED.

## 2023-02-23 NOTE — ED PROVIDER NOTES
ED Provider Note    CHIEF COMPLAINT  Chief Complaint   Patient presents with    Sore Throat     The pt reports feeling sick for the last 2 weeks. The pt reports sore throat for the last 2 weeks. The pt unable to drink fluid or eat food due to pain. The pt went to urgent care last week and tested negative for strep. The pt denies drooling. Redness noted in back of mouth but no neck swelling noted.       HPI/ROS  Rachel Esparza is a 32 y.o. female who presents with a sore throat.  The patient says she has been sick for about 2 weeks.  She states she was seen in urgent care and had a rapid strep that was negative.  She also has some congestion as well as a slight cough.  She states it hurts to drink.  She has not had any vomiting or diarrhea.    PAST MEDICAL HISTORY   has a past medical history of Diabetes (HCC) and Hyperlipidemia.    SURGICAL HISTORY   has a past surgical history that includes mass excision general (N/A, 8/6/2021).    FAMILY HISTORY  Family History   Problem Relation Age of Onset    Hypertension Father     Diabetes Maternal Uncle     Diabetes Paternal Aunt     Diabetes Paternal Uncle     Arthritis Maternal Grandmother         Rheumatoid arthritis       SOCIAL HISTORY  Social History     Tobacco Use    Smoking status: Never    Smokeless tobacco: Never   Vaping Use    Vaping Use: Never used   Substance and Sexual Activity    Alcohol use: Yes     Alcohol/week: 3.0 oz     Types: 5 Shots of liquor per week     Comment: 1-2 drink daily    Drug use: Yes     Types: Marijuana, Inhaled     Comment: marijuana    Sexual activity: Yes     Partners: Male     Birth control/protection: Condom       CURRENT MEDICATIONS  Home Medications       Reviewed by Ethan Fernandez R.N. (Registered Nurse) on 02/23/23 at 0249  Med List Status: Partial     Medication Last Dose Status   B-D ULTRAFINE III SHORT PEN 31G X 8 MM MISC  Active   Continuous Blood Gluc Sensor (FREESTYLE LINDA 2 SENSOR) Misc  Active   Insulin  "Degludec (TRESIBA FLEXTOUCH) 200 UNIT/ML Solution Pen-injector  Active   Insulin Lispro-aabc, 1 U Dial, (LYUMJEV KWIKPEN) 100 UNIT/ML Solution Pen-injector  Active   Insulin Pen Needle 32 G x 4 mm (NOVOFINE PLUS)  Active   ofloxacin (OCUFLOX) 0.3 % Solution  Active                    ALLERGIES  No Known Allergies    PHYSICAL EXAM  VITAL SIGNS: /85   Pulse 78   Temp 36.6 °C (97.8 °F)   Resp 15   Ht 1.6 m (5' 3\")   Wt 69.9 kg (154 lb 1.6 oz)   LMP 02/06/2023   SpO2 96%   BMI 27.30 kg/m²    In general the patient does not appear toxic    Ears tympanic membranes retracted bilaterally, nares have swollen turbinates, oropharynx erythematous without exudates    Neck has anterior cervical adenopathy    Pulmonary chest clear to auscultation bilaterally    Cardiovascular S1-S2 with a regular rate and rhythm      COURSE & MEDICAL DECISION MAKING  This a 32-year-old female who presents to the emergency department with viral pharyngitis.  The patient does not appear toxic.  Due to the duration of her illness we will place the patient on a 3-day course of steroids and she will receive her first dose prior to discharge.  She will focus on hydration and she will also utilize nasal saline spray for the nasal inflammation.  The patient will return if she is acutely worse..    FINAL DIAGNOSIS  Viral pharyngitis    Disposition  The patient will be discharged in stable condition       Electronically signed by: Watson Chan M.D., 2/23/2023 3:17 AM      "

## 2023-02-23 NOTE — ED TRIAGE NOTES
"Chief Complaint   Patient presents with    Sore Throat     The pt reports feeling sick for the last 2 weeks. The pt reports sore throat for the last 2 weeks. The pt unable to drink fluid or eat food due to pain. The pt went to urgent care last week and tested negative for strep. The pt denies drooling. Redness noted in back of mouth but no neck swelling noted.         Pt ambulatory to triage. Pt A&Ox4, for the above complaint.     Pt to lobby . Pt educated on alerting staff in changes to condition. Pt verbalized understanding.     /89   Pulse 82   Temp 36.3 °C (97.4 °F) (Temporal)   Resp 16   Ht 1.6 m (5' 3\")   Wt 69.9 kg (154 lb 1.6 oz)   LMP 02/06/2023   SpO2 98%   BMI 27.30 kg/m²     "

## 2023-09-29 ENCOUNTER — HOSPITAL ENCOUNTER (OUTPATIENT)
Dept: LAB | Facility: MEDICAL CENTER | Age: 33
End: 2023-09-29
Attending: INTERNAL MEDICINE
Payer: COMMERCIAL

## 2023-09-29 LAB
ALBUMIN SERPL BCP-MCNC: 4.4 G/DL (ref 3.2–4.9)
ALBUMIN/GLOB SERPL: 1.9 G/DL
ALP SERPL-CCNC: 90 U/L (ref 30–99)
ALT SERPL-CCNC: 13 U/L (ref 2–50)
ANION GAP SERPL CALC-SCNC: 10 MMOL/L (ref 7–16)
AST SERPL-CCNC: 17 U/L (ref 12–45)
BASOPHILS # BLD AUTO: 0.9 % (ref 0–1.8)
BASOPHILS # BLD: 0.04 K/UL (ref 0–0.12)
BILIRUB SERPL-MCNC: 1 MG/DL (ref 0.1–1.5)
BUN SERPL-MCNC: 9 MG/DL (ref 8–22)
CALCIUM ALBUM COR SERPL-MCNC: 8.6 MG/DL (ref 8.5–10.5)
CALCIUM SERPL-MCNC: 8.9 MG/DL (ref 8.5–10.5)
CHLORIDE SERPL-SCNC: 103 MMOL/L (ref 96–112)
CHOLEST SERPL-MCNC: 187 MG/DL (ref 100–199)
CO2 SERPL-SCNC: 25 MMOL/L (ref 20–33)
CREAT SERPL-MCNC: 0.7 MG/DL (ref 0.5–1.4)
EOSINOPHIL # BLD AUTO: 0.07 K/UL (ref 0–0.51)
EOSINOPHIL NFR BLD: 1.5 % (ref 0–6.9)
ERYTHROCYTE [DISTWIDTH] IN BLOOD BY AUTOMATED COUNT: 43.9 FL (ref 35.9–50)
FASTING STATUS PATIENT QL REPORTED: NORMAL
FERRITIN SERPL-MCNC: 153 NG/ML (ref 10–291)
GFR SERPLBLD CREATININE-BSD FMLA CKD-EPI: 116 ML/MIN/1.73 M 2
GLOBULIN SER CALC-MCNC: 2.3 G/DL (ref 1.9–3.5)
GLUCOSE SERPL-MCNC: 75 MG/DL (ref 65–99)
HCT VFR BLD AUTO: 44.5 % (ref 37–47)
HDLC SERPL-MCNC: 117 MG/DL
HGB BLD-MCNC: 14.3 G/DL (ref 12–16)
IMM GRANULOCYTES # BLD AUTO: 0.02 K/UL (ref 0–0.11)
IMM GRANULOCYTES NFR BLD AUTO: 0.4 % (ref 0–0.9)
LDLC SERPL CALC-MCNC: 50 MG/DL
LYMPHOCYTES # BLD AUTO: 1.43 K/UL (ref 1–4.8)
LYMPHOCYTES NFR BLD: 31.2 % (ref 22–41)
MCH RBC QN AUTO: 32.2 PG (ref 27–33)
MCHC RBC AUTO-ENTMCNC: 32.1 G/DL (ref 32.2–35.5)
MCV RBC AUTO: 100.2 FL (ref 81.4–97.8)
MONOCYTES # BLD AUTO: 0.45 K/UL (ref 0–0.85)
MONOCYTES NFR BLD AUTO: 9.8 % (ref 0–13.4)
NEUTROPHILS # BLD AUTO: 2.57 K/UL (ref 1.82–7.42)
NEUTROPHILS NFR BLD: 56.2 % (ref 44–72)
NRBC # BLD AUTO: 0 K/UL
NRBC BLD-RTO: 0 /100 WBC (ref 0–0.2)
PLATELET # BLD AUTO: 311 K/UL (ref 164–446)
PMV BLD AUTO: 9.8 FL (ref 9–12.9)
POTASSIUM SERPL-SCNC: 3.9 MMOL/L (ref 3.6–5.5)
PROT SERPL-MCNC: 6.7 G/DL (ref 6–8.2)
RBC # BLD AUTO: 4.44 M/UL (ref 4.2–5.4)
SODIUM SERPL-SCNC: 138 MMOL/L (ref 135–145)
T4 FREE SERPL-MCNC: 1.16 NG/DL (ref 0.93–1.7)
THYROPEROXIDASE AB SERPL-ACNC: 14 IU/ML (ref 0–9)
TRIGL SERPL-MCNC: 102 MG/DL (ref 0–149)
TSH SERPL DL<=0.005 MIU/L-ACNC: 0.8 UIU/ML (ref 0.38–5.33)
WBC # BLD AUTO: 4.6 K/UL (ref 4.8–10.8)

## 2023-09-29 PROCEDURE — 82728 ASSAY OF FERRITIN: CPT

## 2023-09-29 PROCEDURE — 36415 COLL VENOUS BLD VENIPUNCTURE: CPT

## 2023-09-29 PROCEDURE — 86376 MICROSOMAL ANTIBODY EACH: CPT

## 2023-09-29 PROCEDURE — 84439 ASSAY OF FREE THYROXINE: CPT

## 2023-09-29 PROCEDURE — 84443 ASSAY THYROID STIM HORMONE: CPT

## 2023-09-29 PROCEDURE — 85025 COMPLETE CBC W/AUTO DIFF WBC: CPT

## 2023-09-29 PROCEDURE — 80053 COMPREHEN METABOLIC PANEL: CPT

## 2023-09-29 PROCEDURE — 86800 THYROGLOBULIN ANTIBODY: CPT

## 2023-09-29 PROCEDURE — 80061 LIPID PANEL: CPT

## 2023-09-30 LAB — THYROGLOB AB SERPL-ACNC: <0.9 IU/ML (ref 0–4)

## 2023-10-24 ENCOUNTER — HOSPITAL ENCOUNTER (OUTPATIENT)
Dept: RADIOLOGY | Facility: MEDICAL CENTER | Age: 33
End: 2023-10-24
Attending: FAMILY MEDICINE
Payer: COMMERCIAL

## 2023-10-24 DIAGNOSIS — E04.9 ENLARGEMENT OF THYROID: ICD-10-CM

## 2023-10-24 PROCEDURE — 76536 US EXAM OF HEAD AND NECK: CPT

## 2023-11-14 ENCOUNTER — HOSPITAL ENCOUNTER (OUTPATIENT)
Facility: MEDICAL CENTER | Age: 33
End: 2023-11-14
Attending: FAMILY MEDICINE
Payer: COMMERCIAL

## 2023-11-14 PROCEDURE — 88175 CYTOPATH C/V AUTO FLUID REDO: CPT

## 2023-11-17 LAB
COMMENT NL11729A: NORMAL
CYTOLOGIST CVX/VAG CYTO: NORMAL
CYTOLOGY CVX/VAG DOC CYTO: NORMAL
CYTOLOGY CVX/VAG DOC THIN PREP: NORMAL
NOTE NL11727A: NORMAL
OTHER STN SPEC: NORMAL
QC REVIEWED BY NL11722A: NORMAL
STAT OF ADQ CVX/VAG CYTO-IMP: NORMAL

## 2024-07-11 ENCOUNTER — HOSPITAL ENCOUNTER (OUTPATIENT)
Dept: RADIOLOGY | Facility: MEDICAL CENTER | Age: 34
End: 2024-07-11
Attending: FAMILY MEDICINE
Payer: COMMERCIAL

## 2024-07-11 DIAGNOSIS — R10.30 INGUINAL PAIN, UNSPECIFIED LATERALITY: ICD-10-CM

## 2024-07-11 PROCEDURE — 76830 TRANSVAGINAL US NON-OB: CPT

## 2024-07-16 ENCOUNTER — APPOINTMENT (OUTPATIENT)
Dept: RADIOLOGY | Facility: MEDICAL CENTER | Age: 34
End: 2024-07-16
Attending: FAMILY MEDICINE
Payer: COMMERCIAL

## (undated) DEVICE — STAPLER SUREFORM 60 12 MM (6EA/BX)

## (undated) DEVICE — SODIUM CHL IRRIGATION 0.9% 1000ML (12EA/CA)

## (undated) DEVICE — PROTECTOR ULNA NERVE - (36PR/CA)

## (undated) DEVICE — SUCTION INSTRUMENT YANKAUER BULBOUS TIP W/O VENT (50EA/CA)

## (undated) DEVICE — BOVIE BLADE COATED - (50/PK)

## (undated) DEVICE — SENSOR SPO2 NEO LNCS ADHESIVE (20/BX) SEE USER NOTES

## (undated) DEVICE — CHLORAPREP 26 ML APPLICATOR - ORANGE TINT(25/CA)

## (undated) DEVICE — SET LEADWIRE 5 LEAD BEDSIDE DISPOSABLE ECG (1SET OF 5/EA)

## (undated) DEVICE — SET EXTENSION WITH 2 PORTS (48EA/CA) ***PART #2C8610 IS A SUBSTITUTE*****

## (undated) DEVICE — NEPTUNE 4 PORT MANIFOLD - (20/PK)

## (undated) DEVICE — SLEEVE VASO CALF MED - (10PR/CA)

## (undated) DEVICE — TRAY SRGPRP PVP IOD WT PRP - (20/CA)

## (undated) DEVICE — ELECTRODE 850 FOAM ADHESIVE - HYDROGEL RADIOTRNSPRNT (50/PK)

## (undated) DEVICE — DRAPE LAPAROTOMY T SHEET - (12EA/CA)

## (undated) DEVICE — MASK ANESTHESIA ADULT  - (100/CA)

## (undated) DEVICE — SUTURE 4-0 PROLENE PS-2 18 (36PK/BX)"

## (undated) DEVICE — GOWN WARMING STANDARD FLEX - (30/CA)

## (undated) DEVICE — CANISTER SUCTION 3000ML MECHANICAL FILTER AUTO SHUTOFF MEDI-VAC NONSTERILE LF DISP  (40EA/CA)

## (undated) DEVICE — SUTURE 3-0 VICRYL PLUS SH - 8X 18 INCH (12/BX)

## (undated) DEVICE — CORDS BIPOLAR COAGULATION - 12FT STERILE DISP. (10EA/BX)

## (undated) DEVICE — SUTURE GENERAL

## (undated) DEVICE — STAPLER SKIN DISP - (6/BX 10BX/CA) VISISTAT

## (undated) DEVICE — HEAD HOLDER JUNIOR/ADULT

## (undated) DEVICE — TUBING CLEARLINK DUO-VENT - C-FLO (48EA/CA)

## (undated) DEVICE — LACTATED RINGERS INJ 1000 ML - (14EA/CA 60CA/PF)

## (undated) DEVICE — PACK MINOR BASIN - (2EA/CA)

## (undated) DEVICE — KIT ANESTHESIA W/CIRCUIT & 3/LT BAG W/FILTER (20EA/CA)

## (undated) DEVICE — SUTURE 4-0 MONOCRYL PLUS PS-2 - 27 INCH (36/BX)

## (undated) DEVICE — GLOVE BIOGEL SZ 8 SURGICAL PF LTX - (50PR/BX 4BX/CA)

## (undated) DEVICE — TOWEL STOP TIMEOUT SAFETY FLAG (40EA/CA)

## (undated) DEVICE — SPONGE GAUZESTER 4 X 4 4PLY - (128PK/CA)

## (undated) DEVICE — ELECTRODE DUAL RETURN W/ CORD - (50/PK)